# Patient Record
Sex: FEMALE | Race: WHITE | Employment: PART TIME | URBAN - METROPOLITAN AREA
[De-identification: names, ages, dates, MRNs, and addresses within clinical notes are randomized per-mention and may not be internally consistent; named-entity substitution may affect disease eponyms.]

---

## 2017-12-19 ENCOUNTER — CLINICAL DOCUMENTATION (OUTPATIENT)
Dept: PHARMACY | Facility: CLINIC | Age: 52
End: 2017-12-19

## 2017-12-19 NOTE — PROGRESS NOTES
Pharmacy Pop Care Documentation:   Patient application received. Patient missing MyChart account creation; pending as of today. Letter sent.

## 2017-12-19 NOTE — LETTER
? Visit with your physician (Second yearly visit)  ? Second A1c  ? Flu vaccination   ? Requirements if A1c is greater than 8 percent:  ? Engage with a [facility]) diabetes educator at one of our hospital locations or an ambulatory care coordinator by phone, if your A1c is greater than 8 percent. We will be reviewing your [employer] account and sending you reminders for needed actions. Please remember if you dont have a [address], you will need to submit documentation to [email or web address] or by fax to [phone]. As a reminder, if programs requirements are not met, your benefit may be terminated and you will not be eligible to participate in the program next year. Thank you for participating in the program and taking steps to improve your health.

## 2017-12-19 NOTE — PROGRESS NOTES
Pharmacy Pop Care Documentation:      The application for Bryson Pérez for enrollment into the diabetes management program has been reviewed and accepted on 12/19/17.     Hulen Cockayne

## 2018-02-08 ENCOUNTER — SCHEDULED TELEPHONE ENCOUNTER (OUTPATIENT)
Dept: PHARMACY | Facility: CLINIC | Age: 53
End: 2018-02-08

## 2018-02-08 NOTE — LETTER
[address]  [address], [address]  Phone: [phone]  Fax: [phone] [address]    02/12/18   Dear Loly Webb,     Thank you for taking the time to speak with us for the [employer] Program! Here is a summary of some things we talked about:    Current medications eligible for copay waiver, up to $600, through [facility]) (mail order) Pharmacy:  - Humulin N, Humulin R, generic ([facility] pharmacy-stocked) insulin syringes and pen needles, Agamatrix meter and supplies  Mail order pharmacy: [phone], option 0 - please allow 2 weeks for processing and shipping prescriptions. Requirements to be completed:  Initial requirements (due by 7/1/2018):  - Office visit with provider for diabetes (1st), A1c (1st), and on statin or contraindication  Ongoing requirements (due by 12/15/2018):  - Office visit with provider for DM (2nd), care coordinator/diabetes educator visit (if A1c over 8%), A1c (2nd), Lipid panel, urine protein, Pneumococcal vaccination: up to date, Influenza vaccination for 5062-7720 and Medication adherence over 70%    After your recent pharmacist visit, the below were identified as opportunities to assist you with your diabetes management:  - Pneumonia vaccine: according to our records, you may be due for a pneumonia vaccine. Talk to your doctor about the pneumonia vaccine ([id number]). - Statin: statin medications can not only lower your cholesterol, but they also lower your risk of heart attack and stroke. Talk to your doctor about whether this type of medication would be beneficial for you. - Included is a list of medications eligible for waived copay through the diabetes program for you toshare with your provider. Please work with your provider to ensure that the 2018 requirements are completed by the appropriate dates.  Please feel free to contact us with questions. Thank you,  [address] Team  Telephone: [phone] Option #7   Fax: [phone]  Email: [email or web address]

## 2018-02-08 NOTE — TELEPHONE ENCOUNTER
Never Used      ASSESSMENT:  Initial Program Requirements (to be completed by 7/1/2018):  [] OV with provider for DM (1st) - says sees every 3 months (next appt March)  [] A1c (1st)  [] On statin or contraindication(s) Not identified states may have taken before but unsure, possibly increased her blood sugars? [x] On ACEi/ARB or contraindication(s) Normal blood pressure, urinary albumin-to-creatinine ratio, and eGFR - per CareEverywhere encounter 12/14/2017 - had OV 11/27/2017 with Dr. Carmita Rueda, /60 mmHg    Ongoing Program Requirements (to be completed by 12/15/2018):  [] OV with provider for DM (2nd)  [] ACC/diabetes educator visit (if A1c over 8%)  [] A1c (2nd)  [] Lipid panel  [] Urine protein  [] Pneumococcal vaccination: up to date - due for PPSV23 per records  [] Influenza vaccination for 3150-8130 - states does not get them yearly  [] Medication adherence over 70%    Formulary Medication Review:  Non-formulary or medications with cost-effective alternatives: none reported/identified. Current medications eligible for copay waiver, up to $600, through [facility]) (mail order) Pharmacy:  - Humulin N, Humulin R  - Generic ([address] [place] [address] pharmacy-stocked) insulin syringes and pen needles  - Agamatrix meter and supplies      Other Considerations:  - Glycemic Goal: <7.0%. Is not at blood glucose goal but is on basal-bolus insulin therapy. .   - Blood Pressure Goal: BP less than 140/90 mmHg due to history of DM: Is at blood pressure goal   - Lipids: DM with LDL  mg/dL with calculated 10-yr ASCVD risk less than 7.5%, candidate for moderate intensity statin. Per 2018 ADA guidelines, candidate for moderate intensity statin. Last ALT WNL. Not currently on statin therapy, unclear history per report. - Smoking status: never smoked    PLAN:  - Consideration(s) for provider: faxed Chetna Matthews MD at [phone].  Fax confirmed 2/12/18 2:30pm.  · 90 day Rxs: Humulin R vial  · Rx insulin syringe - 1/2 mL 31G x 8mm  · Consider moderate intensity statin such as pravastatin 40 mg nightly - pt states she will discuss at next appt  · PPSV23 as appropriate - pt states she will f/u at next appt  - DM program gaps identified:   · Initial requirements: OV with provider for DM (1st), A1c (1st), and on statin or contraindication  · Ongoing requirements: OV with provider for DM (2nd), ACC/diabetes educator visit (if A1c over 8%), A1c (2nd), Lipid panel, urine protein, Pneumococcal vaccination: up to date, Influenza vaccination for 5026-8562 and Medication adherence over 70%  - Education to patient: indication and benefit statin, PPSV23; possible insulins, will send copy of DM program formulary for her and her doctor to review if needed  - Follow up: PCP for identified gaps or as scheduled below  - Upcoming appointments:   Future Appointments  Date Time Provider Dawn Campovered   2/8/2018 11:00 AM SCHEDULE, [facility] None     Estrada Manzo, PharmD, [facility] Pharmacist  Direct: [phone]  Dept: [phone] (toll free [phone], option 7)     CLINICAL PHARMACY CONSULT: MED RECONCILIATION/REVIEW ADDENDUM    For Pharmacy Admin Tracking Only    PHSO: Yes  Total # of Interventions Recommended: 4  - Updated Order #: 1 Updated Order Reason(s):  Other  Total Interventions Accepted: 1  Time Spent (min): 45

## 2018-06-04 ENCOUNTER — HOSPITAL ENCOUNTER (OUTPATIENT)
Age: 53
Discharge: HOME | End: 2018-06-06
Payer: COMMERCIAL

## 2018-06-04 PROCEDURE — 80053 COMPREHEN METABOLIC PANEL: CPT

## 2018-06-04 PROCEDURE — 82306 VITAMIN D 25 HYDROXY: CPT

## 2018-06-04 PROCEDURE — 83036 HEMOGLOBIN GLYCOSYLATED A1C: CPT

## 2018-06-04 PROCEDURE — 84443 ASSAY THYROID STIM HORMONE: CPT

## 2018-06-04 PROCEDURE — 82746 ASSAY OF FOLIC ACID SERUM: CPT

## 2018-06-04 PROCEDURE — 85027 COMPLETE CBC AUTOMATED: CPT

## 2018-06-04 PROCEDURE — 80061 LIPID PANEL: CPT

## 2018-06-04 PROCEDURE — 82607 VITAMIN B-12: CPT

## 2018-06-04 PROCEDURE — 82044 UR ALBUMIN SEMIQUANTITATIVE: CPT

## 2018-06-05 LAB
ALBUMIN SERPL-MCNC: 4.7 G/DL (ref 3.5–5.2)
ALP BLD-CCNC: 122 U/L (ref 35–104)
ALT SERPL-CCNC: 27 U/L (ref 0–32)
ANION GAP SERPL CALCULATED.3IONS-SCNC: 17 MMOL/L (ref 7–16)
AST SERPL-CCNC: 27 U/L (ref 0–31)
BILIRUB SERPL-MCNC: 0.3 MG/DL (ref 0–1.2)
BUN BLDV-MCNC: 12 MG/DL (ref 6–20)
CALCIUM SERPL-MCNC: 10.1 MG/DL (ref 8.6–10.2)
CHLORIDE BLD-SCNC: 98 MMOL/L (ref 98–107)
CHOLESTEROL, TOTAL: 241 MG/DL (ref 0–199)
CO2: 24 MMOL/L (ref 22–29)
CREAT SERPL-MCNC: 0.6 MG/DL (ref 0.5–1)
FOLATE: >20 NG/ML (ref 4.8–24.2)
GFR AFRICAN AMERICAN: >60
GFR NON-AFRICAN AMERICAN: >60 ML/MIN/1.73
GLUCOSE BLD-MCNC: 220 MG/DL (ref 74–109)
HBA1C MFR BLD: 8.9 % (ref 4.8–5.9)
HCT VFR BLD CALC: 38.7 % (ref 34–48)
HDLC SERPL-MCNC: 66 MG/DL
HEMOGLOBIN: 12.6 G/DL (ref 11.5–15.5)
LDL CHOLESTEROL CALCULATED: 156 MG/DL (ref 0–99)
MCH RBC QN AUTO: 31.1 PG (ref 26–35)
MCHC RBC AUTO-ENTMCNC: 32.6 % (ref 32–34.5)
MCV RBC AUTO: 95.6 FL (ref 80–99.9)
MICROALBUMIN UR-MCNC: 16.3 MG/L
PDW BLD-RTO: 12.9 FL (ref 11.5–15)
PLATELET # BLD: 336 E9/L (ref 130–450)
PMV BLD AUTO: 10.2 FL (ref 7–12)
POTASSIUM SERPL-SCNC: 4.7 MMOL/L (ref 3.5–5)
RBC # BLD: 4.05 E12/L (ref 3.5–5.5)
SODIUM BLD-SCNC: 139 MMOL/L (ref 132–146)
TOTAL PROTEIN: 7.6 G/DL (ref 6.4–8.3)
TRIGL SERPL-MCNC: 97 MG/DL (ref 0–149)
TSH SERPL DL<=0.05 MIU/L-ACNC: 1.02 UIU/ML (ref 0.27–4.2)
VITAMIN B-12: 804 PG/ML (ref 211–946)
VITAMIN D 25-HYDROXY: 33 NG/ML (ref 30–100)
VLDLC SERPL CALC-MCNC: 19 MG/DL
WBC # BLD: 6.4 E9/L (ref 4.5–11.5)

## 2018-07-23 ENCOUNTER — CLINICAL DOCUMENTATION (OUTPATIENT)
Dept: PHARMACY | Facility: CLINIC | Age: 53
End: 2018-07-23

## 2018-07-25 ENCOUNTER — OFFICE VISIT (OUTPATIENT)
Dept: CARDIOLOGY CLINIC | Age: 53
End: 2018-07-25
Payer: COMMERCIAL

## 2018-07-25 VITALS
SYSTOLIC BLOOD PRESSURE: 128 MMHG | WEIGHT: 133 LBS | HEIGHT: 63 IN | BODY MASS INDEX: 23.57 KG/M2 | HEART RATE: 72 BPM | DIASTOLIC BLOOD PRESSURE: 60 MMHG | RESPIRATION RATE: 16 BRPM

## 2018-07-25 DIAGNOSIS — R01.1 MURMUR: ICD-10-CM

## 2018-07-25 DIAGNOSIS — R06.02 SOB (SHORTNESS OF BREATH): Primary | ICD-10-CM

## 2018-07-25 DIAGNOSIS — R00.2 PALPITATIONS: ICD-10-CM

## 2018-07-25 PROCEDURE — 93000 ELECTROCARDIOGRAM COMPLETE: CPT | Performed by: INTERNAL MEDICINE

## 2018-07-25 PROCEDURE — 99244 OFF/OP CNSLTJ NEW/EST MOD 40: CPT | Performed by: INTERNAL MEDICINE

## 2018-07-25 NOTE — PROGRESS NOTES
Adelaida Barron  1965  Date of Service: 7/25/2018    Reason for Consultation: We were asked to see Adelaida Barron by Dr. Sowmya Julien MD  regarding shortness of breath and palpitations. History of Chief Complaint: This is a 52 y.o. female with a history of diabetes, hypothyroidism, a lifelong murmur, and palpitations. She states that she has had palpitations for 10-15 years. She does experience mild shortness of breath with the palpitations. She states that she has noticed a slight increase in the past couple of years and that they last 3-5 seconds when they occur. She feels that they occur approximately 2-3 times per week and are not associated with any activities. She does all normal activities including yard work, house work, stairs, and shopping. She rarely has episodes where she feels \"tired and needs to take an extra breath. \" However, no dyspnea on exertion or chest discomfort with any of the above activities. She does state that her entire life she has never been able to lie flat because she feels short of breath. She denies any lower extremity edema or presyncopal symptoms. REVIEW OF SYSTEMS:  As above. See patient questionair for further review of systems. CURRENT MEDICATIONS:  Current Outpatient Prescriptions   Medication Sig Dispense Refill    insulin regular (HUMULIN R;NOVOLIN R) 100 UNIT/ML injection Inject under the skin 10 units in the morning and 10 units in the evening      insulin NPH (HUMULIN N) 100 UNIT/ML injection vial Inject under the skin 5 units in the morning and 5 units in the evening      levothyroxine (SYNTHROID) 88 MCG tablet Take 88 mcg by mouth Daily. To bring the dos      vitamin D (ERGOCALCIFEROL) 53710 UNITS CAPS capsule Take 50,000 Units by mouth once a week. sunday       No current facility-administered medications for this visit.          ALLERGIES:  No Known Allergies    MEDICAL HISTORY:  Past Medical History:   Diagnosis Date    Acquired hypothyroidism 10/9/2016    Closed burst fracture of lumbar vertebra ([address] 10/9/2016    L3, fixation surgically    Closed fracture of left olecranon process 10/9/2016    Closed fracture of right patella 10/9/2016    ORIF    Closed wedge compression fracture of first lumbar vertebra with routine healing 10/9/2016    Kyphoplasty    Diabetes mellitus ([address]     controlled with meds    Heart murmur     no problems. Does not see cardiologist    Sciatica of left side 10/9/2016    Thyroid disease     Type 1 diabetes mellitus ([address] 10/9/2016       SURGICAL HISTORY:  Past Surgical History:   Procedure Laterality Date    BACK SURGERY  2016    CARPAL TUNNEL RELEASE  2012    bilateral     SECTION      x 2    COLONOSCOPY  13    EYE SURGERY  2017    cataract & lense placement    KNEE SURGERY         FAMILY HISTORY:  Family History   Problem Relation Age of Onset    High Blood Pressure Mother     Arthritis Mother     Arthritis Father     High Blood Pressure Father        SOCIAL HISTORY:  Social History     Social History    Marital status:      Spouse name: N/A    Number of children: N/A    Years of education: N/A     Social History Main Topics    Smoking status: Never Smoker    Smokeless tobacco: Never Used    Alcohol use No    Drug use: No    Sexual activity: Not Asked     Other Topics Concern    None     Social History Narrative    None       PHYSICAL EXAM:  Vitals:    18 1317   BP: 128/60   Pulse: 72   Resp: 16   Weight: 133 lb (60.3 kg)   Height: 5' 3\" (1.6 m)       GENERAL:  He is alert and oriented x 3, communicates well, in no distress. NECK:  No masses, trachea is mid position. Supple, full ROM, no JVD or bruits. No palpable thyromegaly or lymphadenopathy. HEART:  Regular rate and rhythm. Normal S1 and S2. There is an S4 gallop and a II/VI holosystolic murmur. No heaves. LUNGS:  Clear to auscultation bilaterally. No use of accessory muscles.    ABDOMEN:

## 2018-08-08 ENCOUNTER — HOSPITAL ENCOUNTER (OUTPATIENT)
Dept: CARDIOLOGY | Age: 53
Discharge: HOME | End: 2018-08-08
Payer: COMMERCIAL

## 2018-08-08 DIAGNOSIS — R06.02 SOB (SHORTNESS OF BREATH): ICD-10-CM

## 2018-08-08 DIAGNOSIS — R00.2 PALPITATIONS: ICD-10-CM

## 2018-08-08 DIAGNOSIS — R01.1 MURMUR: ICD-10-CM

## 2018-08-08 LAB
LV EF: 60 %
LVEF MODALITY: NORMAL

## 2018-08-08 PROCEDURE — 93306 TTE W/DOPPLER COMPLETE: CPT

## 2018-08-10 ENCOUNTER — TELEPHONE (OUTPATIENT)
Dept: CARDIOLOGY CLINIC | Age: 53
End: 2018-08-10

## 2018-08-11 ENCOUNTER — HOSPITAL ENCOUNTER (OUTPATIENT)
Age: 53
Discharge: HOME | End: 2018-08-13
Payer: COMMERCIAL

## 2018-08-11 LAB
T3 TOTAL: 181.1 NG/DL (ref 80–200)
T4 FREE: 2.04 NG/DL (ref 0.93–1.7)
TSH SERPL DL<=0.05 MIU/L-ACNC: 0.51 UIU/ML (ref 0.27–4.2)

## 2018-08-11 PROCEDURE — 84443 ASSAY THYROID STIM HORMONE: CPT

## 2018-08-11 PROCEDURE — 84436 ASSAY OF TOTAL THYROXINE: CPT

## 2018-08-11 PROCEDURE — 84480 ASSAY TRIIODOTHYRONINE (T3): CPT

## 2018-08-11 PROCEDURE — 84439 ASSAY OF FREE THYROXINE: CPT

## 2018-08-13 LAB — T4 TOTAL: 9.4 MCG/DL (ref 4.5–11.7)

## 2018-09-11 DIAGNOSIS — R00.2 PALPITATIONS: ICD-10-CM

## 2018-09-19 ENCOUNTER — TELEPHONE (OUTPATIENT)
Dept: CARDIOLOGY CLINIC | Age: 53
End: 2018-09-19

## 2018-09-19 PROBLEM — I47.1 PSVT (PAROXYSMAL SUPRAVENTRICULAR TACHYCARDIA) (HCC): Status: ACTIVE | Noted: 2018-09-19

## 2018-09-19 PROBLEM — I47.10 PSVT (PAROXYSMAL SUPRAVENTRICULAR TACHYCARDIA): Status: ACTIVE | Noted: 2018-09-19

## 2018-09-19 NOTE — TELEPHONE ENCOUNTER
Patient notified of Hallie Gonzalez results and Dr. Danielle Miller recommendation. Patient refused Toprol at this time. She stated if there was a pattern she should see starting medication, but she doesn't want to start for just one episode.

## 2018-09-26 ENCOUNTER — CLINICAL DOCUMENTATION (OUTPATIENT)
Dept: PHARMACY | Facility: CLINIC | Age: 53
End: 2018-09-26

## 2018-10-03 ENCOUNTER — CARE COORDINATION (OUTPATIENT)
Dept: CARE COORDINATION | Age: 53
End: 2018-10-03

## 2018-10-03 NOTE — CARE COORDINATION
RD outreach regarding employee beneficiary diabetes program. Introduced self/ explained reason for call. Patient did not want to talk to RD and stated that I not outreach to her again. RD will not continue outreach to patient (A1C >8%). Patient will not meet requirements.

## 2018-10-30 ENCOUNTER — OFFICE VISIT (OUTPATIENT)
Dept: ENDOCRINOLOGY | Age: 53
End: 2018-10-30
Payer: COMMERCIAL

## 2018-10-30 VITALS
OXYGEN SATURATION: 99 % | HEIGHT: 63 IN | DIASTOLIC BLOOD PRESSURE: 88 MMHG | TEMPERATURE: 98.1 F | BODY MASS INDEX: 23.78 KG/M2 | SYSTOLIC BLOOD PRESSURE: 138 MMHG | WEIGHT: 134.2 LBS | RESPIRATION RATE: 16 BRPM | HEART RATE: 81 BPM

## 2018-10-30 DIAGNOSIS — E10.9 TYPE 1 DIABETES MELLITUS WITHOUT COMPLICATION (HCC): Primary | ICD-10-CM

## 2018-10-30 DIAGNOSIS — E16.2 HYPOGLYCEMIA: ICD-10-CM

## 2018-10-30 DIAGNOSIS — E07.9 THYROID DISEASE: ICD-10-CM

## 2018-10-30 LAB — HBA1C MFR BLD: 9.3 %

## 2018-10-30 PROCEDURE — 83036 HEMOGLOBIN GLYCOSYLATED A1C: CPT | Performed by: INTERNAL MEDICINE

## 2018-10-30 PROCEDURE — 99204 OFFICE O/P NEW MOD 45 MIN: CPT | Performed by: INTERNAL MEDICINE

## 2018-10-30 RX ORDER — CLINDAMYCIN PHOSPHATE 10 UG/ML
LOTION TOPICAL PRN
COMMUNITY

## 2018-10-30 NOTE — PATIENT INSTRUCTIONS
Keep NPH insulin at 5 units twice daily  Keep the morning dose of Reg insulin at 10 units and decrease the evening dose to 5 units  Follow through with completing a Continuous Glucometer Monitoring study  Decrease the Sunday dose of levothyroxine to 1/2 tablet.  (Keep all other days at one full tablet)  Have a repeat TSH performed in six weeks  See me back in two months  Call with questions or concerns: [phone]

## 2018-10-30 NOTE — PROGRESS NOTES
Genitourinary    [] Frequent urination  [] Burning with urination  [] Blood in urine       Blood and Lymphatics    [] Easy bruising  [] Excessive bleeding  [] Swollen lymph nodes       Neurologic    [] Memory loss   [] Headaches    [] Numbness/Tingling              PE:   Gen - /88 (Site: Left Upper Arm, Position: Sitting, Cuff Size: Medium Adult)   Pulse 81   Temp 98.1 °F (36.7 °C) (Temporal)   Resp 16   Ht 5' 3\" (1.6 m)   Wt 134 lb 3.2 oz (60.9 kg)   LMP 06/17/2013   SpO2 99%   BMI 23.77 kg/m²        WN, WD, NAD   Eyes - sclera without injection, no edema of eyelids, EOMI   Neck - no thyroid nodules, trachea midline   Cardio -  RRR without MGR, 2+ LE edema present   Pulm - CTA b/l, nml respiratory effort   Abd - soft, NT/ND, no masses   Neuro - moves all extremities well, biceps reflexes 1+ b/l,         no deficits in soft touch over extremities   Psych -  nml mood, full affect   H/L - no cervical or subclavian lymphadenopathy    Lab and Imaging:     Ref.  Range 6/4/2018 12:00 8/10/2018 23:59   Sodium Latest Ref Range: 132 - 146 mmol/L 139    Potassium Latest Ref Range: 3.5 - 5.0 mmol/L 4.7    Chloride Latest Ref Range: 98 - 107 mmol/L 98    CO2 Latest Ref Range: 22 - 29 mmol/L 24    BUN Latest Ref Range: 6 - 20 mg/dL 12    Creatinine Latest Ref Range: 0.5 - 1.0 mg/dL 0.6    Anion Gap Latest Ref Range: 7 - 16 mmol/L 17 (H)    GFR Non- Latest Ref Range: >=60 mL/min/1.73 >60    GFR African American Unknown >60    Glucose Latest Ref Range: 74 - 109 mg/dL 220 (H)    Calcium Latest Ref Range: 8.6 - 10.2 mg/dL 10.1    Total Protein Latest Ref Range: 6.4 - 8.3 g/dL 7.6    Cholesterol, Total Latest Ref Range: 0 - 199 mg/dL 241 (H)    HDL Cholesterol Latest Ref Range: >40 mg/dL 66    LDL Calculated Latest Ref Range: 0 - 99 mg/dL 156 (H)    Triglycerides Latest Ref Range: 0 - 149 mg/dL 97    VLDL Cholesterol Calculated Latest Units: mg/dL 19    Albumin Latest Ref Range: 3.5 - 5.2 g/dL 4.7    Alk

## 2018-11-05 ENCOUNTER — NURSE ONLY (OUTPATIENT)
Dept: ENDOCRINOLOGY | Age: 53
End: 2018-11-05
Payer: COMMERCIAL

## 2018-11-05 DIAGNOSIS — E10.9 TYPE 1 DIABETES MELLITUS WITHOUT COMPLICATION (HCC): ICD-10-CM

## 2018-11-05 PROCEDURE — 95250 CONT GLUC MNTR PHYS/QHP EQP: CPT | Performed by: INTERNAL MEDICINE

## 2018-11-07 ENCOUNTER — TELEPHONE (OUTPATIENT)
Dept: PHARMACY | Facility: CLINIC | Age: 53
End: 2018-11-07

## 2018-11-07 NOTE — LETTER
Clay Doll MD  Fax: [phone]  11/8/18    Davonte Mora  1965    Your patient is currently enrolled in [address] [place] [address] Diabetes Program. The goal of this voluntary program is to help [facility] employees and covered dependents reach their health maintenance goals in regards to their diabetes diagnosis. To remain active in the program, we require the patient to meet the following requirements and documentation must be provided by the below deadlines. Please address the following incompletions with patient to assist patient's program involvement or return a message to the below fax number with reason/contraindication as to why patient cannot complete requirement. Ongoing Program Requirements (to be completed by 12/15/2018):  [] On statin (list statin and dose or contraindication): __________   [] Pneumococcal vaccination (if applicable): Completed on __________  [] Influenza vaccination for 1033-9087: Completed on __________      Please contact our team with any questions.      Thank you,  [address] Team  Telephone [phone] Option #7   Fax [phone]

## 2018-11-08 ENCOUNTER — NURSE ONLY (OUTPATIENT)
Dept: ENDOCRINOLOGY | Age: 53
End: 2018-11-08
Payer: COMMERCIAL

## 2018-11-08 ENCOUNTER — CLINICAL DOCUMENTATION (OUTPATIENT)
Dept: ENDOCRINOLOGY | Age: 53
End: 2018-11-08

## 2018-11-08 DIAGNOSIS — E10.65 TYPE 1 DIABETES MELLITUS WITH HYPERGLYCEMIA (HCC): ICD-10-CM

## 2018-11-08 PROCEDURE — 95251 CONT GLUC MNTR ANALYSIS I&R: CPT | Performed by: INTERNAL MEDICINE

## 2018-12-11 ENCOUNTER — TELEPHONE (OUTPATIENT)
Dept: PHARMACY | Facility: CLINIC | Age: 53
End: 2018-12-11

## 2018-12-12 ENCOUNTER — TELEPHONE (OUTPATIENT)
Dept: PHARMACY | Facility: CLINIC | Age: 53
End: 2018-12-12

## 2018-12-13 ENCOUNTER — CLINICAL DOCUMENTATION (OUTPATIENT)
Dept: PHARMACY | Facility: CLINIC | Age: 53
End: 2018-12-13

## 2018-12-13 NOTE — LETTER
[address] [address]           12/13/18     Dear Brenna Pak,    We regret to inform you that you have been disqualified from The [address] [place] [address] DM Program because of the following:    Per your request on 12/12/18. You will not receive the copay waiver up to $600 towards your diabetic medications and supplies in 2018. If you qualify once again, you will be eligible to reapply to The [address] [place] [address] DM Program the following calendar year. [address] [place] [address] Team    [phone] Option #7    Email: [email or web address]    Fax Number: [phone]

## 2018-12-13 NOTE — PROGRESS NOTES
Pharmacy Pop Care Documentation:     Ladi Bella is being removed from the diabetes management program for the following reason(s): Per patient request    Nora Marquez

## 2019-04-18 ENCOUNTER — HOSPITAL ENCOUNTER (OUTPATIENT)
Age: 54
Discharge: HOME | End: 2019-04-18
Payer: COMMERCIAL

## 2019-04-18 PROCEDURE — 36415 COLL VENOUS BLD VENIPUNCTURE: CPT

## 2019-04-18 PROCEDURE — 86235 NUCLEAR ANTIGEN ANTIBODY: CPT

## 2019-04-18 PROCEDURE — 86038 ANTINUCLEAR ANTIBODIES: CPT

## 2019-04-19 LAB
ANTI-NUCLEAR ANTIBODY (ANA): NEGATIVE
ENA TO SMITH (SM) ANTIBODY: NEGATIVE

## 2019-05-31 ENCOUNTER — HOSPITAL ENCOUNTER (OUTPATIENT)
Age: 54
Discharge: HOME | End: 2019-06-02
Payer: COMMERCIAL

## 2019-05-31 LAB — TSH SERPL DL<=0.05 MIU/L-ACNC: 0.58 UIU/ML (ref 0.27–4.2)

## 2019-05-31 PROCEDURE — 84443 ASSAY THYROID STIM HORMONE: CPT

## 2019-07-08 ENCOUNTER — HOSPITAL ENCOUNTER (OUTPATIENT)
Age: 54
Discharge: HOME | End: 2019-07-10
Payer: COMMERCIAL

## 2019-07-08 LAB
ALBUMIN SERPL-MCNC: 4.6 G/DL (ref 3.5–5.2)
ALP BLD-CCNC: 129 U/L (ref 35–104)
ALT SERPL-CCNC: 40 U/L (ref 0–32)
ANION GAP SERPL CALCULATED.3IONS-SCNC: 13 MMOL/L (ref 7–16)
AST SERPL-CCNC: 37 U/L (ref 0–31)
BASOPHILS ABSOLUTE: 0.07 E9/L (ref 0–0.2)
BASOPHILS RELATIVE PERCENT: 1 % (ref 0–2)
BILIRUB SERPL-MCNC: 0.3 MG/DL (ref 0–1.2)
BUN BLDV-MCNC: 14 MG/DL (ref 6–20)
CALCIUM SERPL-MCNC: 10 MG/DL (ref 8.6–10.2)
CHLORIDE BLD-SCNC: 99 MMOL/L (ref 98–107)
CHOLESTEROL, TOTAL: 241 MG/DL (ref 0–199)
CO2: 27 MMOL/L (ref 22–29)
CREAT SERPL-MCNC: 0.7 MG/DL (ref 0.5–1)
EOSINOPHILS ABSOLUTE: 0.15 E9/L (ref 0.05–0.5)
EOSINOPHILS RELATIVE PERCENT: 2.2 % (ref 0–6)
GFR AFRICAN AMERICAN: >60
GFR NON-AFRICAN AMERICAN: >60 ML/MIN/1.73
GLUCOSE BLD-MCNC: 115 MG/DL (ref 74–99)
HBA1C MFR BLD: 8.9 % (ref 4–5.6)
HCT VFR BLD CALC: 39.1 % (ref 34–48)
HDLC SERPL-MCNC: 73 MG/DL
HEMOGLOBIN: 12.7 G/DL (ref 11.5–15.5)
IMMATURE GRANULOCYTES #: 0.05 E9/L
IMMATURE GRANULOCYTES %: 0.7 % (ref 0–5)
LDL CHOLESTEROL CALCULATED: 141 MG/DL (ref 0–99)
LYMPHOCYTES ABSOLUTE: 1.75 E9/L (ref 1.5–4)
LYMPHOCYTES RELATIVE PERCENT: 25.7 % (ref 20–42)
MCH RBC QN AUTO: 31.6 PG (ref 26–35)
MCHC RBC AUTO-ENTMCNC: 32.5 % (ref 32–34.5)
MCV RBC AUTO: 97.3 FL (ref 80–99.9)
MICROALBUMIN UR-MCNC: 13.2 MG/L
MONOCYTES ABSOLUTE: 0.53 E9/L (ref 0.1–0.95)
MONOCYTES RELATIVE PERCENT: 7.8 % (ref 2–12)
NEUTROPHILS ABSOLUTE: 4.25 E9/L (ref 1.8–7.3)
NEUTROPHILS RELATIVE PERCENT: 62.6 % (ref 43–80)
PDW BLD-RTO: 12.8 FL (ref 11.5–15)
PLATELET # BLD: 330 E9/L (ref 130–450)
PMV BLD AUTO: 10.4 FL (ref 7–12)
POTASSIUM SERPL-SCNC: 4.6 MMOL/L (ref 3.5–5)
RBC # BLD: 4.02 E12/L (ref 3.5–5.5)
SODIUM BLD-SCNC: 139 MMOL/L (ref 132–146)
TOTAL PROTEIN: 7.7 G/DL (ref 6.4–8.3)
TRIGL SERPL-MCNC: 134 MG/DL (ref 0–149)
TSH SERPL DL<=0.05 MIU/L-ACNC: 0.65 UIU/ML (ref 0.27–4.2)
VLDLC SERPL CALC-MCNC: 27 MG/DL
WBC # BLD: 6.8 E9/L (ref 4.5–11.5)

## 2019-07-08 PROCEDURE — 85025 COMPLETE CBC W/AUTO DIFF WBC: CPT

## 2019-07-08 PROCEDURE — 82044 UR ALBUMIN SEMIQUANTITATIVE: CPT

## 2019-07-08 PROCEDURE — 80053 COMPREHEN METABOLIC PANEL: CPT

## 2019-07-08 PROCEDURE — 83036 HEMOGLOBIN GLYCOSYLATED A1C: CPT

## 2019-07-08 PROCEDURE — 80061 LIPID PANEL: CPT

## 2019-07-08 PROCEDURE — 84443 ASSAY THYROID STIM HORMONE: CPT

## 2019-08-15 ENCOUNTER — HOSPITAL ENCOUNTER (OUTPATIENT)
Age: 54
Discharge: HOME | End: 2019-08-17
Payer: COMMERCIAL

## 2019-08-15 PROCEDURE — 88305 TISSUE EXAM BY PATHOLOGIST: CPT

## 2019-08-29 ENCOUNTER — HOSPITAL ENCOUNTER (OUTPATIENT)
Age: 54
Discharge: HOME | End: 2019-08-29
Payer: COMMERCIAL

## 2019-08-29 LAB
BASOPHILS ABSOLUTE: 0.05 E9/L (ref 0–0.2)
BASOPHILS RELATIVE PERCENT: 0.7 % (ref 0–2)
EOSINOPHILS ABSOLUTE: 0.13 E9/L (ref 0.05–0.5)
EOSINOPHILS RELATIVE PERCENT: 1.8 % (ref 0–6)
FERRITIN: 68 NG/ML
FOLLICLE STIMULATING HORMONE: 84.8 MIU/ML
HCT VFR BLD CALC: 39 % (ref 34–48)
HEMOGLOBIN: 12.8 G/DL (ref 11.5–15.5)
IMMATURE GRANULOCYTES #: 0.02 E9/L
IMMATURE GRANULOCYTES %: 0.3 % (ref 0–5)
IRON: 92 MCG/DL (ref 37–145)
LUTEINIZING HORMONE: 55 MIU/ML
LYMPHOCYTES ABSOLUTE: 1.87 E9/L (ref 1.5–4)
LYMPHOCYTES RELATIVE PERCENT: 26.6 % (ref 20–42)
MCH RBC QN AUTO: 31.5 PG (ref 26–35)
MCHC RBC AUTO-ENTMCNC: 32.8 % (ref 32–34.5)
MCV RBC AUTO: 96.1 FL (ref 80–99.9)
MONOCYTES ABSOLUTE: 0.46 E9/L (ref 0.1–0.95)
MONOCYTES RELATIVE PERCENT: 6.5 % (ref 2–12)
NEUTROPHILS ABSOLUTE: 4.5 E9/L (ref 1.8–7.3)
NEUTROPHILS RELATIVE PERCENT: 64.1 % (ref 43–80)
PDW BLD-RTO: 12 FL (ref 11.5–15)
PLATELET # BLD: 320 E9/L (ref 130–450)
PMV BLD AUTO: 10 FL (ref 7–12)
RBC # BLD: 4.06 E12/L (ref 3.5–5.5)
T3 TOTAL: 94.56 NG/DL (ref 80–200)
T4 TOTAL: 9.4 MCG/DL (ref 4.5–11.7)
TSH SERPL DL<=0.05 MIU/L-ACNC: 0.85 UIU/ML (ref 0.27–4.2)
WBC # BLD: 7 E9/L (ref 4.5–11.5)

## 2019-08-29 PROCEDURE — 85025 COMPLETE CBC W/AUTO DIFF WBC: CPT

## 2019-08-29 PROCEDURE — 84270 ASSAY OF SEX HORMONE GLOBUL: CPT

## 2019-08-29 PROCEDURE — 86038 ANTINUCLEAR ANTIBODIES: CPT

## 2019-08-29 PROCEDURE — 82728 ASSAY OF FERRITIN: CPT

## 2019-08-29 PROCEDURE — 84630 ASSAY OF ZINC: CPT

## 2019-08-29 PROCEDURE — 84403 ASSAY OF TOTAL TESTOSTERONE: CPT

## 2019-08-29 PROCEDURE — 83540 ASSAY OF IRON: CPT

## 2019-08-29 PROCEDURE — 82627 DEHYDROEPIANDROSTERONE: CPT

## 2019-08-29 PROCEDURE — 36415 COLL VENOUS BLD VENIPUNCTURE: CPT

## 2019-08-29 PROCEDURE — 84480 ASSAY TRIIODOTHYRONINE (T3): CPT

## 2019-08-29 PROCEDURE — 83002 ASSAY OF GONADOTROPIN (LH): CPT

## 2019-08-29 PROCEDURE — 84443 ASSAY THYROID STIM HORMONE: CPT

## 2019-08-29 PROCEDURE — 83001 ASSAY OF GONADOTROPIN (FSH): CPT

## 2019-08-29 PROCEDURE — 84436 ASSAY OF TOTAL THYROXINE: CPT

## 2019-08-30 LAB — ANTI-NUCLEAR ANTIBODY (ANA): NEGATIVE

## 2019-09-01 LAB — DHEAS (DHEA SULFATE): 41 UG/DL (ref 26–200)

## 2019-09-04 LAB
SEX HORMONE BINDING GLOBULIN: 149 NMOL/L (ref 30–135)
TESTOSTERONE FREE-NONMALE: ABNORMAL PG/ML (ref 0.6–3.8)
TESTOSTERONE TOTAL: <3 NG/DL (ref 20–70)
ZINC: 94.7 UG/DL (ref 60–120)

## 2020-02-11 ENCOUNTER — HOSPITAL ENCOUNTER (OUTPATIENT)
Age: 55
Discharge: HOME | End: 2020-02-13
Payer: COMMERCIAL

## 2020-02-11 LAB
ALBUMIN SERPL-MCNC: 4.9 G/DL (ref 3.5–5.2)
ALP BLD-CCNC: 123 U/L (ref 35–104)
ALT SERPL-CCNC: 25 U/L (ref 0–32)
ANION GAP SERPL CALCULATED.3IONS-SCNC: 19 MMOL/L (ref 7–16)
AST SERPL-CCNC: 24 U/L (ref 0–31)
BASOPHILS ABSOLUTE: 0.05 E9/L (ref 0–0.2)
BASOPHILS RELATIVE PERCENT: 0.8 % (ref 0–2)
BILIRUB SERPL-MCNC: 0.5 MG/DL (ref 0–1.2)
BUN BLDV-MCNC: 16 MG/DL (ref 6–20)
CALCIUM SERPL-MCNC: 10.3 MG/DL (ref 8.6–10.2)
CHLORIDE BLD-SCNC: 100 MMOL/L (ref 98–107)
CHOLESTEROL, TOTAL: 221 MG/DL (ref 0–199)
CO2: 22 MMOL/L (ref 22–29)
CREAT SERPL-MCNC: 0.7 MG/DL (ref 0.5–1)
CREATININE URINE: 78 MG/DL (ref 29–226)
EOSINOPHILS ABSOLUTE: 0.14 E9/L (ref 0.05–0.5)
EOSINOPHILS RELATIVE PERCENT: 2.3 % (ref 0–6)
GFR AFRICAN AMERICAN: >60
GFR NON-AFRICAN AMERICAN: >60 ML/MIN/1.73
GLUCOSE BLD-MCNC: 215 MG/DL (ref 74–99)
HBA1C MFR BLD: 8.9 % (ref 4–5.6)
HCT VFR BLD CALC: 42.7 % (ref 34–48)
HDLC SERPL-MCNC: 77 MG/DL
HEMOGLOBIN: 13.5 G/DL (ref 11.5–15.5)
IMMATURE GRANULOCYTES #: 0.02 E9/L
IMMATURE GRANULOCYTES %: 0.3 % (ref 0–5)
LDL CHOLESTEROL CALCULATED: 127 MG/DL (ref 0–99)
LYMPHOCYTES ABSOLUTE: 2.13 E9/L (ref 1.5–4)
LYMPHOCYTES RELATIVE PERCENT: 34.4 % (ref 20–42)
MCH RBC QN AUTO: 30.3 PG (ref 26–35)
MCHC RBC AUTO-ENTMCNC: 31.6 % (ref 32–34.5)
MCV RBC AUTO: 95.7 FL (ref 80–99.9)
MICROALBUMIN UR-MCNC: 19.5 MG/L
MICROALBUMIN/CREAT UR-RTO: 25 (ref 0–30)
MONOCYTES ABSOLUTE: 0.46 E9/L (ref 0.1–0.95)
MONOCYTES RELATIVE PERCENT: 7.4 % (ref 2–12)
NEUTROPHILS ABSOLUTE: 3.4 E9/L (ref 1.8–7.3)
NEUTROPHILS RELATIVE PERCENT: 54.8 % (ref 43–80)
PDW BLD-RTO: 12.1 FL (ref 11.5–15)
PLATELET # BLD: 365 E9/L (ref 130–450)
PMV BLD AUTO: 10.9 FL (ref 7–12)
POTASSIUM SERPL-SCNC: 4.6 MMOL/L (ref 3.5–5)
RBC # BLD: 4.46 E12/L (ref 3.5–5.5)
SODIUM BLD-SCNC: 141 MMOL/L (ref 132–146)
T3 TOTAL: 85.04 NG/DL (ref 80–200)
T3 UPTAKE PERCENT: 34.2 % (ref 22.5–37)
T4 FREE: 1.63 NG/DL (ref 0.93–1.7)
TOTAL PROTEIN: 7.9 G/DL (ref 6.4–8.3)
TRIGL SERPL-MCNC: 87 MG/DL (ref 0–149)
TSH SERPL DL<=0.05 MIU/L-ACNC: 0.64 UIU/ML (ref 0.27–4.2)
VLDLC SERPL CALC-MCNC: 17 MG/DL
WBC # BLD: 6.2 E9/L (ref 4.5–11.5)

## 2020-02-11 PROCEDURE — 85025 COMPLETE CBC W/AUTO DIFF WBC: CPT

## 2020-02-11 PROCEDURE — 84439 ASSAY OF FREE THYROXINE: CPT

## 2020-02-11 PROCEDURE — 82570 ASSAY OF URINE CREATININE: CPT

## 2020-02-11 PROCEDURE — 84480 ASSAY TRIIODOTHYRONINE (T3): CPT

## 2020-02-11 PROCEDURE — 84479 ASSAY OF THYROID (T3 OR T4): CPT

## 2020-02-11 PROCEDURE — 80053 COMPREHEN METABOLIC PANEL: CPT

## 2020-02-11 PROCEDURE — 83036 HEMOGLOBIN GLYCOSYLATED A1C: CPT

## 2020-02-11 PROCEDURE — 82044 UR ALBUMIN SEMIQUANTITATIVE: CPT

## 2020-02-11 PROCEDURE — 84443 ASSAY THYROID STIM HORMONE: CPT

## 2020-02-11 PROCEDURE — 80061 LIPID PANEL: CPT

## 2020-02-12 LAB — T4 TOTAL: 9.4 MCG/DL (ref 4.5–11.7)

## 2020-07-14 ENCOUNTER — HOSPITAL ENCOUNTER (OUTPATIENT)
Age: 55
Discharge: HOME | End: 2020-07-16
Payer: COMMERCIAL

## 2020-07-14 LAB
ALBUMIN SERPL-MCNC: 4.8 G/DL (ref 3.5–5.2)
ALP BLD-CCNC: 133 U/L (ref 35–104)
ALT SERPL-CCNC: 34 U/L (ref 0–32)
ANION GAP SERPL CALCULATED.3IONS-SCNC: 16 MMOL/L (ref 7–16)
AST SERPL-CCNC: 31 U/L (ref 0–31)
BASOPHILS ABSOLUTE: 0.05 E9/L (ref 0–0.2)
BASOPHILS RELATIVE PERCENT: 0.8 % (ref 0–2)
BILIRUB SERPL-MCNC: 0.3 MG/DL (ref 0–1.2)
BUN BLDV-MCNC: 14 MG/DL (ref 6–20)
CALCIUM SERPL-MCNC: 10.5 MG/DL (ref 8.6–10.2)
CHLORIDE BLD-SCNC: 100 MMOL/L (ref 98–107)
CHOLESTEROL, TOTAL: 200 MG/DL (ref 0–199)
CO2: 25 MMOL/L (ref 22–29)
CREAT SERPL-MCNC: 0.8 MG/DL (ref 0.5–1)
CREATININE URINE: 97 MG/DL (ref 29–226)
EOSINOPHILS ABSOLUTE: 0.21 E9/L (ref 0.05–0.5)
EOSINOPHILS RELATIVE PERCENT: 3.2 % (ref 0–6)
GFR AFRICAN AMERICAN: >60
GFR NON-AFRICAN AMERICAN: >60 ML/MIN/1.73
GLUCOSE BLD-MCNC: 91 MG/DL (ref 74–99)
HBA1C MFR BLD: 9.9 % (ref 4–5.6)
HCT VFR BLD CALC: 39.2 % (ref 34–48)
HDLC SERPL-MCNC: 65 MG/DL
HEMOGLOBIN: 12.4 G/DL (ref 11.5–15.5)
IMMATURE GRANULOCYTES #: 0.01 E9/L
IMMATURE GRANULOCYTES %: 0.2 % (ref 0–5)
LDL CHOLESTEROL CALCULATED: 112 MG/DL (ref 0–99)
LYMPHOCYTES ABSOLUTE: 2.26 E9/L (ref 1.5–4)
LYMPHOCYTES RELATIVE PERCENT: 34.8 % (ref 20–42)
MCH RBC QN AUTO: 30.3 PG (ref 26–35)
MCHC RBC AUTO-ENTMCNC: 31.6 % (ref 32–34.5)
MCV RBC AUTO: 95.8 FL (ref 80–99.9)
MICROALBUMIN UR-MCNC: <12 MG/L
MICROALBUMIN/CREAT UR-RTO: ABNORMAL (ref 0–30)
MONOCYTES ABSOLUTE: 0.6 E9/L (ref 0.1–0.95)
MONOCYTES RELATIVE PERCENT: 9.2 % (ref 2–12)
NEUTROPHILS ABSOLUTE: 3.36 E9/L (ref 1.8–7.3)
NEUTROPHILS RELATIVE PERCENT: 51.8 % (ref 43–80)
PDW BLD-RTO: 12.5 FL (ref 11.5–15)
PLATELET # BLD: 356 E9/L (ref 130–450)
PMV BLD AUTO: 10.8 FL (ref 7–12)
POTASSIUM SERPL-SCNC: 4.7 MMOL/L (ref 3.5–5)
RBC # BLD: 4.09 E12/L (ref 3.5–5.5)
SODIUM BLD-SCNC: 141 MMOL/L (ref 132–146)
TOTAL PROTEIN: 7.4 G/DL (ref 6.4–8.3)
TRIGL SERPL-MCNC: 115 MG/DL (ref 0–149)
TSH SERPL DL<=0.05 MIU/L-ACNC: 0.36 UIU/ML (ref 0.27–4.2)
VLDLC SERPL CALC-MCNC: 23 MG/DL
WBC # BLD: 6.5 E9/L (ref 4.5–11.5)

## 2020-07-14 PROCEDURE — 85025 COMPLETE CBC W/AUTO DIFF WBC: CPT

## 2020-07-14 PROCEDURE — 80053 COMPREHEN METABOLIC PANEL: CPT

## 2020-07-14 PROCEDURE — 82044 UR ALBUMIN SEMIQUANTITATIVE: CPT

## 2020-07-14 PROCEDURE — 80061 LIPID PANEL: CPT

## 2020-07-14 PROCEDURE — 84443 ASSAY THYROID STIM HORMONE: CPT

## 2020-07-14 PROCEDURE — 82570 ASSAY OF URINE CREATININE: CPT

## 2020-07-14 PROCEDURE — 83036 HEMOGLOBIN GLYCOSYLATED A1C: CPT

## 2020-12-10 ENCOUNTER — HOSPITAL ENCOUNTER (OUTPATIENT)
Age: 55
Discharge: HOME | End: 2020-12-10
Payer: COMMERCIAL

## 2020-12-10 LAB — TSH SERPL DL<=0.05 MIU/L-ACNC: 0.58 UIU/ML (ref 0.27–4.2)

## 2020-12-10 PROCEDURE — 36415 COLL VENOUS BLD VENIPUNCTURE: CPT

## 2020-12-10 PROCEDURE — 84443 ASSAY THYROID STIM HORMONE: CPT

## 2021-01-20 ENCOUNTER — HOSPITAL ENCOUNTER (EMERGENCY)
Age: 56
Discharge: HOME | End: 2021-01-20
Payer: COMMERCIAL

## 2021-01-20 ENCOUNTER — APPOINTMENT (OUTPATIENT)
Dept: GENERAL RADIOLOGY | Age: 56
End: 2021-01-20
Payer: COMMERCIAL

## 2021-01-20 ENCOUNTER — NURSE TRIAGE (OUTPATIENT)
Dept: OTHER | Facility: CLINIC | Age: 56
End: 2021-01-20

## 2021-01-20 VITALS
HEART RATE: 84 BPM | WEIGHT: 130 LBS | SYSTOLIC BLOOD PRESSURE: 148 MMHG | TEMPERATURE: 97.2 F | BODY MASS INDEX: 23.04 KG/M2 | OXYGEN SATURATION: 100 % | RESPIRATION RATE: 14 BRPM | DIASTOLIC BLOOD PRESSURE: 88 MMHG | HEIGHT: 63 IN

## 2021-01-20 DIAGNOSIS — S83.91XA SPRAIN OF RIGHT KNEE, UNSPECIFIED LIGAMENT, INITIAL ENCOUNTER: Primary | ICD-10-CM

## 2021-01-20 PROCEDURE — 73560 X-RAY EXAM OF KNEE 1 OR 2: CPT

## 2021-01-20 PROCEDURE — 99284 EMERGENCY DEPT VISIT MOD MDM: CPT

## 2021-01-20 PROCEDURE — 6370000000 HC RX 637 (ALT 250 FOR IP): Performed by: PHYSICIAN ASSISTANT

## 2021-01-20 RX ORDER — HYDROCODONE BITARTRATE AND ACETAMINOPHEN 5; 325 MG/1; MG/1
1 TABLET ORAL ONCE
Status: COMPLETED | OUTPATIENT
Start: 2021-01-20 | End: 2021-01-20

## 2021-01-20 RX ORDER — NAPROXEN 500 MG/1
500 TABLET ORAL 2 TIMES DAILY
Qty: 14 TABLET | Refills: 0 | Status: SHIPPED | OUTPATIENT
Start: 2021-01-20 | End: 2021-11-16 | Stop reason: CLARIF

## 2021-01-20 RX ORDER — HYDROCODONE BITARTRATE AND ACETAMINOPHEN 5; 325 MG/1; MG/1
1 TABLET ORAL EVERY 6 HOURS PRN
Qty: 12 TABLET | Refills: 0 | Status: SHIPPED | OUTPATIENT
Start: 2021-01-20 | End: 2021-01-23

## 2021-01-20 RX ADMIN — HYDROCODONE BITARTRATE AND ACETAMINOPHEN 1 TABLET: 5; 325 TABLET ORAL at 22:21

## 2021-01-20 ASSESSMENT — PAIN DESCRIPTION - PAIN TYPE
TYPE: ACUTE PAIN
TYPE: ACUTE PAIN

## 2021-01-20 ASSESSMENT — PAIN DESCRIPTION - PROGRESSION: CLINICAL_PROGRESSION: GRADUALLY IMPROVING

## 2021-01-20 ASSESSMENT — PAIN SCALES - GENERAL: PAINLEVEL_OUTOF10: 10

## 2021-01-20 ASSESSMENT — PAIN DESCRIPTION - ORIENTATION: ORIENTATION: RIGHT

## 2021-01-20 ASSESSMENT — PAIN DESCRIPTION - DESCRIPTORS: DESCRIPTORS: ACHING

## 2021-01-21 NOTE — TELEPHONE ENCOUNTER
Reason for Disposition   Can't stand (bear weight) or walk    Answer Assessment - Initial Assessment Questions  1. MECHANISM: \"How did the injury happen? \" (e.g., twisting injury, direct blow)       Slipped on ice, twisted right knee and landed on knee  2. ONSET: \"When did the injury happen? \" (Minutes or hours ago)       2030 today  3. LOCATION: \"Where is the injury located? \"       Right knee  4. APPEARANCE of INJURY: \"What does the injury look like? \"       Feels swollen  5. SEVERITY: \"Can you put weight on that leg? \" \"Can you walk? \"       Very little  6. SIZE: For cuts, bruises, or swelling, ask: \"How large is it? \" (e.g., inches or centimeters;  entire joint)       none  7. PAIN: \"Is there pain? \" If so, ask: \"How bad is the pain? \"    (e.g., Scale 1-10; or mild, moderate, severe)      12/10  8. TETANUS: For any breaks in the skin, ask: \"When was the last tetanus booster? \"      na  9. OTHER SYMPTOMS: \"Do you have any other symptoms? \"  (e.g., \"pop\" when knee injured, swelling, locking, buckling)       [place] a pop in knee  10. PREGNANCY: \"Is there any chance you are pregnant? \" \"When was your last menstrual period? \"        n/a    Protocols used: KNEE INJURY-ADULT-    Brief description of triage: Richar Brantley fell outside on ice @approximately 2030 1/20/2021. Caller twisted knee, heard a pop and landed on knee. Caller is having a hard time walking and can not put weight on knee. No cuts or bleeding at this time. Triage indicates for patient to Go to ED Now. Care advice provided, patient verbalizes understanding; denies any other questions or concerns; instructed to call back for any new or worsening symptoms. Attention Provider: Thank you for allowing me to participate in the care of your patient. The patient was connected to triage in response to symptoms provided. Please do not respond through this encounter as the response is not directed to a shared pool.

## 2021-01-21 NOTE — ED PROVIDER NOTES
Independent MLP  HPI:  21, Time: 10:06 PM DAISHA Oliver is a 60 y.o. female presenting to the ED for  Right knee pain , beginning today . Sailaja Borges The complaint has been persistent, moderate in severity, and worsened by movement of right knee . Patient comes in with complaint of right knee pain. She states she had a history of fracture of the knee in the past today she was coming outside and slipped on the ice. She was able to catch herself and did not fall. She did feel a pop in the knee. She denies any head injury no loss of consciousness no neck pain    Review of Systems:   A complete review of systems was performed and pertinent positives and negatives are stated within HPI, all other systems reviewed and are negative.          --------------------------------------------- PAST HISTORY ---------------------------------------------  Past Medical History:  has a past medical history of Acquired hypothyroidism, Closed burst fracture of lumbar vertebra ([address] Closed fracture of left olecranon process, Closed fracture of right patella, Closed wedge compression fracture of first lumbar vertebra with routine healing, Heart murmur, Sciatica of left side, Thyroid disease, and Type 1 diabetes mellitus ([address] Past Surgical History:  has a past surgical history that includes  section; Carpal tunnel release (); eye surgery (2017); Colonoscopy (13); back surgery (2016); and knee surgery (2016). Social History:  reports that she has never smoked. She has never used smokeless tobacco. She reports that she does not drink alcohol or use drugs. Family History: family history includes Arthritis in her father and mother; High Blood Pressure in her father and mother; No Known Problems in her brother, brother, brother, brother, sister, sister, son, and son. The patients home medications have been reviewed. Allergies: Patient has no known allergies. -------------------------------------------------- RESULTS -------------------------------------------------  All laboratory and radiology results have been personally reviewed by myself   LABS:  No results found for this visit on 01/20/21. RADIOLOGY:  Interpreted by Radiologist.  XR KNEE RIGHT (1-2 VIEWS)   Final Result   No acute fracture or dislocation of the right knee. Old healed fracture of the patella. Moderate-sized suprapatellar effusion.          ------------------------- NURSING NOTES AND VITALS REVIEWED ---------------------------   The nursing notes within the ED encounter and vital signs as below have been reviewed. BP (!) 148/88   Pulse 84   Temp 97.2 °F (36.2 °C) (Temporal)   Resp 14   Ht 5' 3\" (1.6 m)   Wt 130 lb (59 kg)   LMP 06/17/2013   SpO2 100%   BMI 23.03 kg/m²   Oxygen Saturation Interpretation: Normal      ---------------------------------------------------PHYSICAL EXAM--------------------------------------      Constitutional/General: Alert and oriented x3, well appearing, non toxic in NAD  Head: Normocephalic and atraumatic  Eyes: PERRL, EOMI  Mouth: Oropharynx clear, handling secretions, no trismus  Neck: Supple, full ROM,   Pulmonary: Lungs clear to auscultation bilaterally, no wheezes, rales, or rhonchi. Not in respiratory distress  Cardiovascular:  Regular rate and rhythm, no murmurs, gallops, or rubs. 2+ distal pulses  Abdomen: Soft, non tender, non distended,   Extremities: Moves all extremities x 4. Warm and well perfused tender to palpation medial lateral right knee joint integrity intact no bony point tenderness of the lower tib-fib or femur.   Skin: warm and dry without rash  Neurologic: GCS 15,  Psych: Normal Affect      ------------------------------ ED COURSE/MEDICAL DECISION MAKING----------------------  Medications   HYDROcodone-acetaminophen (NORCO) 5-325 MG per tablet 1 tablet (1 tablet Oral Given 1/20/21 2221)         ED COURSE:

## 2021-02-26 ENCOUNTER — IMMUNIZATION (OUTPATIENT)
Dept: PRIMARY CARE CLINIC | Age: 56
End: 2021-02-26
Payer: COMMERCIAL

## 2021-02-26 PROCEDURE — 0001A COVID-19, PFIZER VACCINE 30MCG/0.3ML DOSE: CPT | Performed by: PHYSICIAN ASSISTANT

## 2021-02-26 PROCEDURE — 91300 COVID-19, PFIZER VACCINE 30MCG/0.3ML DOSE: CPT | Performed by: PHYSICIAN ASSISTANT

## 2021-03-23 ENCOUNTER — IMMUNIZATION (OUTPATIENT)
Dept: PRIMARY CARE CLINIC | Age: 56
End: 2021-03-23
Payer: COMMERCIAL

## 2021-03-23 PROCEDURE — 0002A COVID-19, PFIZER VACCINE 30MCG/0.3ML DOSE: CPT | Performed by: NURSE PRACTITIONER

## 2021-03-23 PROCEDURE — 91300 COVID-19, PFIZER VACCINE 30MCG/0.3ML DOSE: CPT | Performed by: NURSE PRACTITIONER

## 2021-04-13 ENCOUNTER — HOSPITAL ENCOUNTER (OUTPATIENT)
Age: 56
Discharge: HOME | End: 2021-04-13
Payer: COMMERCIAL

## 2021-04-13 LAB — TSH SERPL DL<=0.05 MIU/L-ACNC: 0.41 UIU/ML (ref 0.27–4.2)

## 2021-04-13 PROCEDURE — 36415 COLL VENOUS BLD VENIPUNCTURE: CPT

## 2021-04-13 PROCEDURE — 84443 ASSAY THYROID STIM HORMONE: CPT

## 2021-05-10 ENCOUNTER — HOSPITAL ENCOUNTER (OUTPATIENT)
Age: 56
Discharge: HOME | End: 2021-05-10
Payer: COMMERCIAL

## 2021-05-10 PROCEDURE — 82671 ASSAY OF ESTROGENS: CPT

## 2021-05-10 PROCEDURE — 84144 ASSAY OF PROGESTERONE: CPT

## 2021-05-10 PROCEDURE — 83001 ASSAY OF GONADOTROPIN (FSH): CPT

## 2021-05-10 PROCEDURE — 36415 COLL VENOUS BLD VENIPUNCTURE: CPT

## 2021-05-10 PROCEDURE — 84403 ASSAY OF TOTAL TESTOSTERONE: CPT

## 2021-05-10 PROCEDURE — 83002 ASSAY OF GONADOTROPIN (LH): CPT

## 2021-05-11 LAB
FOLLICLE STIMULATING HORMONE: 65.8 MIU/ML
LUTEINIZING HORMONE: 49.7 MIU/ML
TESTOSTERONE TOTAL: <2.5 NG/DL

## 2021-05-12 LAB — PROGESTERONE LEVEL: <0.05 NG/ML

## 2021-05-14 LAB
ESTRADIOL LEVEL: 3 PG/ML
ESTROGEN TOTAL: 22 PG/ML
ESTRONE: 19 PG/ML

## 2021-10-06 ENCOUNTER — APPOINTMENT (OUTPATIENT)
Dept: GENERAL RADIOLOGY | Age: 56
End: 2021-10-06
Payer: COMMERCIAL

## 2021-10-06 ENCOUNTER — HOSPITAL ENCOUNTER (EMERGENCY)
Age: 56
Discharge: HOME | End: 2021-10-06
Payer: COMMERCIAL

## 2021-10-06 VITALS
BODY MASS INDEX: 23.74 KG/M2 | HEIGHT: 63 IN | RESPIRATION RATE: 16 BRPM | HEART RATE: 70 BPM | TEMPERATURE: 97.5 F | SYSTOLIC BLOOD PRESSURE: 166 MMHG | OXYGEN SATURATION: 99 % | WEIGHT: 134 LBS | DIASTOLIC BLOOD PRESSURE: 86 MMHG

## 2021-10-06 DIAGNOSIS — S80.01XA CONTUSION OF RIGHT KNEE, INITIAL ENCOUNTER: Primary | ICD-10-CM

## 2021-10-06 DIAGNOSIS — I10 HYPERTENSION, UNSPECIFIED TYPE: ICD-10-CM

## 2021-10-06 PROCEDURE — 73562 X-RAY EXAM OF KNEE 3: CPT

## 2021-10-06 PROCEDURE — 90471 IMMUNIZATION ADMIN: CPT | Performed by: PHYSICIAN ASSISTANT

## 2021-10-06 PROCEDURE — 90715 TDAP VACCINE 7 YRS/> IM: CPT | Performed by: PHYSICIAN ASSISTANT

## 2021-10-06 PROCEDURE — 6360000002 HC RX W HCPCS: Performed by: PHYSICIAN ASSISTANT

## 2021-10-06 PROCEDURE — 6370000000 HC RX 637 (ALT 250 FOR IP): Performed by: PHYSICIAN ASSISTANT

## 2021-10-06 PROCEDURE — 99283 EMERGENCY DEPT VISIT LOW MDM: CPT

## 2021-10-06 RX ORDER — HYDROCODONE BITARTRATE AND ACETAMINOPHEN 5; 325 MG/1; MG/1
1 TABLET ORAL ONCE
Status: COMPLETED | OUTPATIENT
Start: 2021-10-06 | End: 2021-10-06

## 2021-10-06 RX ADMIN — TETANUS TOXOID, REDUCED DIPHTHERIA TOXOID AND ACELLULAR PERTUSSIS VACCINE, ADSORBED 0.5 ML: 5; 2.5; 8; 8; 2.5 SUSPENSION INTRAMUSCULAR at 15:27

## 2021-10-06 RX ADMIN — HYDROCODONE BITARTRATE AND ACETAMINOPHEN 1 TABLET: 5; 325 TABLET ORAL at 15:27

## 2021-10-06 ASSESSMENT — PAIN DESCRIPTION - DESCRIPTORS: DESCRIPTORS: DISCOMFORT

## 2021-10-06 ASSESSMENT — PAIN SCALES - GENERAL
PAINLEVEL_OUTOF10: 7
PAINLEVEL_OUTOF10: 7

## 2021-10-06 ASSESSMENT — PAIN DESCRIPTION - PROGRESSION: CLINICAL_PROGRESSION: NOT CHANGED

## 2021-10-06 ASSESSMENT — PAIN DESCRIPTION - ONSET: ONSET: ON-GOING

## 2021-10-06 ASSESSMENT — PAIN DESCRIPTION - PAIN TYPE: TYPE: ACUTE PAIN

## 2021-10-06 ASSESSMENT — PAIN DESCRIPTION - LOCATION: LOCATION: KNEE

## 2021-10-06 ASSESSMENT — PAIN DESCRIPTION - FREQUENCY: FREQUENCY: CONTINUOUS

## 2021-10-06 ASSESSMENT — PAIN DESCRIPTION - ORIENTATION: ORIENTATION: RIGHT

## 2021-10-06 NOTE — ED NOTES
Bed: 31  Expected date:   Expected time:   Means of arrival:   Comments:  EMS     Angelo Bonilla RN  10/06/21 6908
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
## 2021-11-11 ENCOUNTER — TELEPHONE (OUTPATIENT)
Dept: ADMINISTRATIVE | Age: 56
End: 2021-11-11

## 2021-11-11 NOTE — TELEPHONE ENCOUNTER
NP scheduled from the workque. Referral states NP saw [facility] in 2018 - however, pt c/o of Chest tightness/jaw pain - [facility] scheduling into Jan?     Patient Appointment Form:      PCP: Dr. [place]  Referring: Dr. [place]    Has the Patient:    Seen a Cardiologist? Yes [facility] 2018- recs in [address] [place] [address] Rd - c/o Chest tightness/jaw pain did not wait until Jan with [facility]    Had a heart catheterization? No    Had heart surgery? No    Had a stress test or nuclear stress test? No    Had an echocardiogram? Yes 8/8/18 [facility]     Had a vascular ultrasound? No    Had a 24/48 heart monitor or extended cardiac event monitor? Yes 9/11/18 Event Monitor Epic     Had recent blood work in the last 6 months? Had a pacemaker/ICD/ILR implant? No    Seen an Electrophysiologist? No        Will send records via:  All recs in [facility] [address] in [address] [place] [address] Rd - PCP recs should be in [place]       Date & time of appointment:  11/16/21 @ 10:45 with Dr. Andreas parks, soonest available

## 2021-11-16 ENCOUNTER — OFFICE VISIT (OUTPATIENT)
Dept: CARDIOLOGY CLINIC | Age: 56
End: 2021-11-16
Payer: COMMERCIAL

## 2021-11-16 VITALS
WEIGHT: 150.2 LBS | DIASTOLIC BLOOD PRESSURE: 70 MMHG | HEIGHT: 63 IN | SYSTOLIC BLOOD PRESSURE: 159 MMHG | HEART RATE: 74 BPM | BODY MASS INDEX: 26.61 KG/M2 | RESPIRATION RATE: 18 BRPM

## 2021-11-16 DIAGNOSIS — R07.2 PRECORDIAL PAIN: ICD-10-CM

## 2021-11-16 PROBLEM — R07.9 CHEST PAIN: Status: ACTIVE | Noted: 2021-11-16

## 2021-11-16 PROCEDURE — 99244 OFF/OP CNSLTJ NEW/EST MOD 40: CPT | Performed by: INTERNAL MEDICINE

## 2021-11-16 PROCEDURE — 93000 ELECTROCARDIOGRAM COMPLETE: CPT | Performed by: INTERNAL MEDICINE

## 2021-11-16 RX ORDER — LEVOTHYROXINE AND LIOTHYRONINE 57; 13.5 UG/1; UG/1
TABLET ORAL
COMMUNITY
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
## 2021-11-22 ENCOUNTER — TELEPHONE (OUTPATIENT)
Dept: CARDIOLOGY | Age: 56
End: 2021-11-22

## 2021-11-23 ENCOUNTER — TELEPHONE (OUTPATIENT)
Dept: CARDIOLOGY | Age: 56
End: 2021-11-23

## 2021-11-23 NOTE — TELEPHONE ENCOUNTER
Left message on voice mail reminding patient of nuclear stress test appointment on Nov. 29, 2021 at 0700. Instructions for test left on voice mail. Asked patient to call with any questions or if unable to keep appointment.

## 2021-11-29 ENCOUNTER — HOSPITAL ENCOUNTER (OUTPATIENT)
Dept: CARDIOLOGY | Age: 56
Discharge: HOME | End: 2021-11-29
Payer: COMMERCIAL

## 2021-11-29 ENCOUNTER — TELEPHONE (OUTPATIENT)
Dept: CARDIOLOGY CLINIC | Age: 56
End: 2021-11-29

## 2021-11-29 VITALS
BODY MASS INDEX: 26.58 KG/M2 | SYSTOLIC BLOOD PRESSURE: 158 MMHG | RESPIRATION RATE: 16 BRPM | DIASTOLIC BLOOD PRESSURE: 72 MMHG | HEIGHT: 63 IN | HEART RATE: 80 BPM | WEIGHT: 150 LBS

## 2021-11-29 DIAGNOSIS — R07.2 PRECORDIAL PAIN: ICD-10-CM

## 2021-11-29 LAB
LV EF: 82 %
LVEF MODALITY: NORMAL

## 2021-11-29 PROCEDURE — 2580000003 HC RX 258: Performed by: INTERNAL MEDICINE

## 2021-11-29 PROCEDURE — 78452 HT MUSCLE IMAGE SPECT MULT: CPT

## 2021-11-29 PROCEDURE — A9500 TC99M SESTAMIBI: HCPCS | Performed by: INTERNAL MEDICINE

## 2021-11-29 PROCEDURE — 3430000000 HC RX DIAGNOSTIC RADIOPHARMACEUTICAL: Performed by: INTERNAL MEDICINE

## 2021-11-29 PROCEDURE — 93017 CV STRESS TEST TRACING ONLY: CPT

## 2021-11-29 RX ORDER — SODIUM CHLORIDE 0.9 % (FLUSH) 0.9 %
10 SYRINGE (ML) INJECTION PRN
Status: DISCONTINUED | OUTPATIENT
Start: 2021-11-29 | End: 2021-11-30 | Stop reason: HOSPADM

## 2021-11-29 RX ADMIN — SODIUM CHLORIDE, PRESERVATIVE FREE 10 ML: 5 INJECTION INTRAVENOUS at 07:20

## 2021-11-29 RX ADMIN — Medication 30 MILLICURIE: at 09:20

## 2021-11-29 RX ADMIN — SODIUM CHLORIDE, PRESERVATIVE FREE 10 ML: 5 INJECTION INTRAVENOUS at 09:20

## 2021-11-29 RX ADMIN — Medication 10.5 MILLICURIE: at 07:20

## 2021-11-29 NOTE — TELEPHONE ENCOUNTER
Patient notified and instructed on stress test results and recommendation with medication and follow-up.   appt scheduled

## 2021-11-29 NOTE — PROCEDURES
30-35 minutes following the intravenous injection of 10.5 mCi of (Tc-Sestamibi) followed by 10 ml of Normal Saline. At peak exercise, the patient was injected intravenously with 30.0 mCi of (Tc-Sestamibi) followed by 10 ml of Normal Saline. Gated post-stress tomographic imaging was performed 20-25 minutes after stress. FINDINGS: The overall quality of the study was good. Left ventricular cavity size was noted to be normal.    Rotational analog analysis demonstrated no patient motion or abnormal extracardiac radioactivity. A moderate defect was present in the apical anterior wall(s) that was  small sized by quantification. The resting images reveal complete reversibility. Gated SPECT left ventricular ejection fraction was calculated to be 82%, with normal myocardial thickening and wall motion. Impression:    1. Exercise EKG was non-diagnostic secondary to baseline ST-T changes. 2. The patient experienced angina pectoris with exercise. 3. The myocardial perfusion imaging was abnormal. The abnormality was a a small sized completely reversible defect in the apical anterior wall. 4. Overall left ventricular systolic function was normal without regional wall motion abnormalities. 5. Exercise capacity was above average. 6. Intermediate risk general exercise treadmill test.    Thank you for sending your patient to this [employer].      Electronically signed by Ansley Bush MD on 11/29/21 at 12:32 PM EST

## 2021-11-30 RX ORDER — METOPROLOL SUCCINATE 50 MG/1
50 TABLET, EXTENDED RELEASE ORAL DAILY
Qty: 30 TABLET | Refills: 5 | Status: SHIPPED
Start: 2021-11-30 | End: 2022-03-08 | Stop reason: ALTCHOICE

## 2021-11-30 RX ORDER — ATORVASTATIN CALCIUM 40 MG/1
40 TABLET, FILM COATED ORAL DAILY
Qty: 30 TABLET | Refills: 5 | Status: SHIPPED | OUTPATIENT
Start: 2021-11-30

## 2021-12-01 ENCOUNTER — TELEPHONE (OUTPATIENT)
Dept: CARDIOLOGY CLINIC | Age: 56
End: 2021-12-01

## 2021-12-01 NOTE — TELEPHONE ENCOUNTER
Patient called and stated that she had a stress test and she was instructed to take medication and see you in follow-up.   Before she starts this medication she would like a full explination of how this stress test  was abnormal.

## 2021-12-02 NOTE — TELEPHONE ENCOUNTER
Left message per patient instruction (as she is working) as to Dr. Fran Wilson recommendations of an appt and left appt date and time as well.

## 2021-12-16 ENCOUNTER — OFFICE VISIT (OUTPATIENT)
Dept: CARDIOLOGY CLINIC | Age: 56
End: 2021-12-16
Payer: COMMERCIAL

## 2021-12-16 VITALS
RESPIRATION RATE: 16 BRPM | DIASTOLIC BLOOD PRESSURE: 88 MMHG | SYSTOLIC BLOOD PRESSURE: 173 MMHG | WEIGHT: 150.6 LBS | HEART RATE: 78 BPM | BODY MASS INDEX: 26.68 KG/M2 | HEIGHT: 63 IN

## 2021-12-16 DIAGNOSIS — R07.2 PRECORDIAL PAIN: ICD-10-CM

## 2021-12-16 DIAGNOSIS — I47.1 PSVT (PAROXYSMAL SUPRAVENTRICULAR TACHYCARDIA) (HCC): Primary | ICD-10-CM

## 2021-12-16 PROBLEM — I25.10 CAD (CORONARY ARTERY DISEASE): Status: ACTIVE | Noted: 2021-11-16

## 2021-12-16 PROCEDURE — 99214 OFFICE O/P EST MOD 30 MIN: CPT | Performed by: INTERNAL MEDICINE

## 2021-12-16 PROCEDURE — 93000 ELECTROCARDIOGRAM COMPLETE: CPT | Performed by: INTERNAL MEDICINE

## 2021-12-16 NOTE — PROGRESS NOTES
Chief Complaint   Patient presents with    Coronary Artery Disease       Patient Active Problem List    Diagnosis Date Noted    Type 1 diabetes mellitus ([facility] 10/09/2016     Priority: High     Class: Chronic    Acquired hypothyroidism 10/09/2016     Priority: Medium     Class: Chronic    Sciatica of left side 10/09/2016     Priority: Medium     Class: Chronic    CAD (coronary artery disease) 11/16/2021     Overview Note:     A. ETT-N 11/29/21 ([address]): 10 METS, angina, non diagnostic EKG, small reversible akiko-apical defect  EF 82%      Thyroid disease     PSVT (paroxysmal supraventricular tachycardia) ([facility] 09/19/2018     Overview Note:     A 6 beat run on a CardioNet 2018. Current Outpatient Medications   Medication Sig Dispense Refill    atorvastatin (LIPITOR) 40 MG tablet Take 1 tablet by mouth daily 30 tablet 5    thyroid (ARMOUR THYROID) 90 MG tablet [place] Thyroid 90 mg tablet   Take 1 tablet every day by oral route for 90 days.  clindamycin (CLEOCIN T) 1 % lotion Apply topically as needed Apply topically 2 times daily. Not using      insulin regular (HUMULIN R;NOVOLIN R) 100 UNIT/ML injection Inject under the skin 10 units in the morning and 10 units in the evening. Uses sliding scale based on blood sugar      insulin NPH (HUMULIN N) 100 UNIT/ML injection vial Inject under the skin 5 units in the morning and 5 units in the evening      vitamin D (ERGOCALCIFEROL) 22242 UNITS CAPS capsule Take 50,000 Units by mouth once a week. sunday      metoprolol succinate (TOPROL XL) 50 MG extended release tablet Take 1 tablet by mouth daily (Patient not taking: Reported on 12/16/2021) 30 tablet 5     No current facility-administered medications for this visit.         No Known Allergies    Vitals:    12/16/21 1119   BP: (!) 173/88   Pulse: 78   Resp: 16   Weight: 150 lb 9.6 oz (68.3 kg)   Height: 5' 3\" (1.6 m)       Social History     Socioeconomic History    Marital status:      Spouse name: Shantel Carr Number of children: 2    Years of education: Not on file    Highest education level: Not on file   Occupational History    Occupation:      Comment: [employer]   Tobacco Use    Smoking status: Never Smoker    Smokeless tobacco: Never Used   Vaping Use    Vaping Use: Never used   Substance and Sexual Activity    Alcohol use: No    Drug use: No    Sexual activity: Not on file   Other Topics Concern    Not on file   Social History Narrative    [place]  in a house     Social Determinants of Health     Financial Resource Strain:     Difficulty of Paying Living Expenses: Not on file   Food Insecurity:     Worried About Running Out of Food in the Last Year: Not on file    Quirino of Food in the Last Year: Not on file   Transportation Needs:     Lack of Transportation (Medical): Not on file    Lack of Transportation (Non-Medical):  Not on file   Physical Activity:     Days of Exercise per Week: Not on file    Minutes of Exercise per Session: Not on file   Stress:     Feeling of Stress : Not on file   Social Connections:     Frequency of Communication with Friends and Family: Not on file    Frequency of Social Gatherings with Friends and Family: Not on file    Attends Religion Services: Not on file    Active Member of [address] [place] [address] [employer] or Organizations: Not on file    Attends Club or Organization Meetings: Not on file    Marital Status: Not on file   Intimate Partner Violence:     Fear of Current or Ex-Partner: Not on file    Emotionally Abused: Not on file    Physically Abused: Not on file    Sexually Abused: Not on file   Housing Stability:     Unable to Pay for Housing in the Last Year: Not on file    Number of [place] in the Last Year: Not on file    Unstable Housing in the Last Year: Not on file       Family History   Problem Relation Age of Onset    High Blood Pressure Mother     Arthritis Mother     Arthritis Father     High Blood Pressure Father     No Known Problems Sister     No Known Problems Sister     No Known Problems Brother     No Known Problems Son     No Known Problems Son     No Known Problems Brother     No Known Problems Brother     No Known Problems Brother          SUBJECTIVE: Joss Rocha presents to the office today for f/u after stress testing     (Sen by dr Myles Cedeno in 2018 for benign palpitations, normal echo and only 6 beat SVT noted. Demurred on BB recommendation\"    . Active lady. She complains of exertional chest pressure/discomfort and denies rest   chest pain, claudication, dyspnea, fatigue, irregular heart beat, lower extremity edema, near-syncope, orthopnea, palpitations, paroxysmal nocturnal dyspnea, syncope and tachypnea. Non smoker. Home BP readings good. On recent ETT had chest discomfort at peak exercise that was likely angina, and abnormal nuclear scan  Has stated statin as advised, but not beta blocker        Review of Systems:   Heart: as above   Lungs: as above   Eyes: denies changes in vision or discharge. Ears: denies changes in hearing or pain. Nose: denies epistaxis or masses   Throat: denies sore throat or trouble swallowing. Neuro: denies numbness, tingling, tremors. Skin: denies rashes or itching. : denies hematuria, dysuria   GI: denies vomiting, diarrhea   Psych: denies mood changed, anxiety, depression. all others negative. Physical Exam   BP (!) 173/88   Pulse 78   Resp 16   Ht 5' 3\" (1.6 m)   Wt 150 lb 9.6 oz (68.3 kg)   LMP 06/17/2013   BMI 26.68 kg/m²   Constitutional: Oriented to person, place, and time. Well-developed and well-nourished. No distress. Head: Normocephalic and atraumatic. Eyes: EOM are normal. Pupils are equal, round, and reactive to light. Neck: Normal range of motion. Neck supple. No hepatojugular reflux and no JVD present. Carotid bruit is not present.    Cardiovascular: Normal rate, regular rhythm, normal heart sounds and intact distal pulses. Exam reveals no gallop and no friction rub. No murmur heard. Pulmonary/Chest: Effort normal and breath sounds normal. No respiratory distress. No wheezes. No rales. Abdominal: Soft. Bowel sounds are normal. No distension and no mass. No tenderness. No rebound and no guarding. Musculoskeletal: Normal range of motion. No edema and no tenderness. Neurological: Alert and oriented to person, place, and time. Skin: Skin is warm and dry. No rash noted. Not diaphoretic. No erythema. Psychiatric: Normal mood and affect.  Behavior is normal.     EKG:  normal sinus rhythm, rate 78, normal .    ASSESSMENT AND PLAN:  Patient Active Problem List   Diagnosis    Type 1 diabetes mellitus (HCC)    Acquired hypothyroidism    Sciatica of left side    PSVT (paroxysmal supraventricular tachycardia) (HCC)    Thyroid disease    CAD (coronary artery disease)     Patient with symptoms suspicious for exertional angina, stable, which were provoked on stress test and accompanied by mild reversible defect  Parameters suggest initial strategy medical  Discussed rationale for beta blocker and high intensity statin    OV one month      Michael Ferguson M.D  [facility]

## 2022-02-11 ENCOUNTER — OFFICE VISIT (OUTPATIENT)
Dept: CARDIOLOGY CLINIC | Age: 57
End: 2022-02-11
Payer: COMMERCIAL

## 2022-02-11 VITALS
HEART RATE: 63 BPM | BODY MASS INDEX: 27.14 KG/M2 | RESPIRATION RATE: 16 BRPM | DIASTOLIC BLOOD PRESSURE: 94 MMHG | WEIGHT: 153.2 LBS | SYSTOLIC BLOOD PRESSURE: 158 MMHG | HEIGHT: 63 IN

## 2022-02-11 DIAGNOSIS — I25.119 CORONARY ARTERY DISEASE WITH ANGINA PECTORIS, UNSPECIFIED VESSEL OR LESION TYPE, UNSPECIFIED WHETHER NATIVE OR TRANSPLANTED HEART (HCC): Primary | ICD-10-CM

## 2022-02-11 DIAGNOSIS — E78.5 HYPERLIPIDEMIA, UNSPECIFIED HYPERLIPIDEMIA TYPE: ICD-10-CM

## 2022-02-11 PROCEDURE — 93000 ELECTROCARDIOGRAM COMPLETE: CPT | Performed by: INTERNAL MEDICINE

## 2022-02-11 PROCEDURE — 99215 OFFICE O/P EST HI 40 MIN: CPT | Performed by: INTERNAL MEDICINE

## 2022-02-11 RX ORDER — TIMOLOL MALEATE 5 MG/ML
SOLUTION/ DROPS OPHTHALMIC
COMMUNITY
Start: 2022-01-21

## 2022-02-11 RX ORDER — LATANOPROST 50 UG/ML
SOLUTION/ DROPS OPHTHALMIC
COMMUNITY
Start: 2021-11-19

## 2022-02-11 NOTE — PROGRESS NOTES
Chief Complaint   Patient presents with    Coronary Artery Disease    Hyperlipidemia       Patient Active Problem List    Diagnosis Date Noted    Type 1 diabetes mellitus ([facility] 10/09/2016     Priority: High     Class: Chronic    Acquired hypothyroidism 10/09/2016     Priority: Medium     Class: Chronic    Sciatica of left side 10/09/2016     Priority: Medium     Class: Chronic    HLD (hyperlipidemia) 02/11/2022    CAD (coronary artery disease) 11/16/2021     Overview Note:     A. ETT-N 11/29/21 ([address]): 10 METS, angina, non diagnostic EKG, small reversible akiko-apical defect  EF 82%      Thyroid disease     PSVT (paroxysmal supraventricular tachycardia) ([facility] 09/19/2018     Overview Note:     A 6 beat run on a CardioNet 2018. Current Outpatient Medications   Medication Sig Dispense Refill    timolol (TIMOPTIC) 0.5 % ophthalmic solution       latanoprost (XALATAN) 0.005 % ophthalmic solution       atorvastatin (LIPITOR) 40 MG tablet Take 1 tablet by mouth daily 30 tablet 5    metoprolol succinate (TOPROL XL) 50 MG extended release tablet Take 1 tablet by mouth daily 30 tablet 5    thyroid (ARMOUR THYROID) 90 MG tablet [place] Thyroid 90 mg tablet   Take 1 tablet every day by oral route for 90 days.  clindamycin (CLEOCIN T) 1 % lotion Apply topically as needed Apply topically 2 times daily. Not using      insulin regular (HUMULIN R;NOVOLIN R) 100 UNIT/ML injection Inject under the skin 10 units in the morning and 10 units in the evening. Uses sliding scale based on blood sugar      insulin NPH (HUMULIN N) 100 UNIT/ML injection vial Inject under the skin 5 units in the morning and 5 units in the evening      vitamin D (ERGOCALCIFEROL) 76251 UNITS CAPS capsule Take 50,000 Units by mouth once a week. sunday       No current facility-administered medications for this visit.         No Known Allergies    Vitals:    02/11/22 1119   BP: (!) 158/94   Pulse: 63   Resp: 16   Weight: 153 lb 3.2 oz (69.5 kg)   Height: 5' 3\" (1.6 m)       Social History     Socioeconomic History    Marital status:      Spouse name: Denise Perea Number of children: 2    Years of education: Not on file    Highest education level: Not on file   Occupational History    Occupation:      Comment: [employer]   Tobacco Use    Smoking status: Never Smoker    Smokeless tobacco: Never Used   Vaping Use    Vaping Use: Never used   Substance and Sexual Activity    Alcohol use: No    Drug use: No    Sexual activity: Not on file   Other Topics Concern    Not on file   Social History Narrative    [place]  in a house     Social Determinants of Health     Financial Resource Strain:     Difficulty of Paying Living Expenses: Not on file   Food Insecurity:     Worried About Running Out of Food in the Last Year: Not on file    Quirino of Food in the Last Year: Not on file   Transportation Needs:     Lack of Transportation (Medical): Not on file    Lack of Transportation (Non-Medical):  Not on file   Physical Activity:     Days of Exercise per Week: Not on file    Minutes of Exercise per Session: Not on file   Stress:     Feeling of Stress : Not on file   Social Connections:     Frequency of Communication with Friends and Family: Not on file    Frequency of Social Gatherings with Friends and Family: Not on file    Attends Hoahaoism Services: Not on file    Active Member of [address] [place] [address] or Organizations: Not on file    Attends Club or Organization Meetings: Not on file    Marital Status: Not on file   Intimate Partner Violence:     Fear of Current or Ex-Partner: Not on file    Emotionally Abused: Not on file    Physically Abused: Not on file    Sexually Abused: Not on file   Housing Stability:     Unable to Pay for Housing in the Last Year: Not on file    Number of [place] in the Last Year: Not on file    Unstable Housing in the Last Year: Not on file       Family History Problem Relation Age of Onset    High Blood Pressure Mother     Arthritis Mother     Arthritis Father     High Blood Pressure Father     No Known Problems Sister     No Known Problems Sister     No Known Problems Brother     No Known Problems Son     No Known Problems Son     No Known Problems Brother     No Known Problems Brother     No Known Problems Brother          SUBJECTIVE: Amie Schaefer presents to the office today for f/u after initiation of high intensity statin and beta blocker  Claims a litany of side effects. Difficult historian      She complains of exertional chest pressure/discomfort and denies rest   chest pain, claudication, dyspnea, fatigue, irregular heart beat, lower extremity edema, near-syncope, orthopnea, palpitations, paroxysmal nocturnal dyspnea, syncope and tachypnea. Non smoker. Home BP readings good. On recent ETT had chest discomfort at peak exercise that was likely angina, and abnormal nuclear scan          Review of Systems:   Heart: as above   Lungs: as above   Eyes: denies changes in vision or discharge. Ears: denies changes in hearing or pain. Nose: denies epistaxis or masses   Throat: denies sore throat or trouble swallowing. Neuro: denies numbness, tingling, tremors. Skin: denies rashes or itching. : denies hematuria, dysuria   GI: denies vomiting, diarrhea   Psych: denies mood changed, anxiety, depression. all others negative. Physical Exam   BP (!) 158/94   Pulse 63   Resp 16   Ht 5' 3\" (1.6 m)   Wt 153 lb 3.2 oz (69.5 kg)   LMP 06/17/2013   BMI 27.14 kg/m²   Constitutional: Oriented to person, place, and time. Well-developed and well-nourished. No distress. Head: Normocephalic and atraumatic. Eyes: EOM are normal. Pupils are equal, round, and reactive to light. Neck: Normal range of motion. Neck supple. No hepatojugular reflux and no JVD present. Carotid bruit is not present.    Cardiovascular: Normal rate, regular rhythm, normal heart sounds and intact distal pulses. Exam reveals no gallop and no friction rub. No murmur heard. Pulmonary/Chest: Effort normal and breath sounds normal. No respiratory distress. No wheezes. No rales. Abdominal: Soft. Bowel sounds are normal. No distension and no mass. No tenderness. No rebound and no guarding. Musculoskeletal: Normal range of motion. No edema and no tenderness. Neurological: Alert and oriented to person, place, and time. Skin: Skin is warm and dry. No rash noted. Not diaphoretic. No erythema. Psychiatric: Normal mood and affect. Behavior is normal.     EKG:  normal sinus rhythm, normal .    ASSESSMENT AND PLAN:  Patient Active Problem List   Diagnosis    Type 1 diabetes mellitus (HCC)    Acquired hypothyroidism    Sciatica of left side    PSVT (paroxysmal supraventricular tachycardia) (HCC)    Thyroid disease    CAD (coronary artery disease)    HLD (hyperlipidemia)     Diabetic patient with symptoms not incompatible for exertional angina, stable, which were provoked on stress test and accompanied by mild reversible defect  Attempt to Rx medically come up against a claim of a varied multitude of side effects. Difficult historian   Cardiac catheterization technique was discussed with the patient. The risks of the procedure, including myocardial infarction, stroke, death, vascular or renal injury, bleeding, or the need for emergency surgery were discussed.     Start ANIBAL Stein M.D  [address] [facility]

## 2022-03-04 ENCOUNTER — TELEPHONE (OUTPATIENT)
Dept: CARDIAC CATH/INVASIVE PROCEDURES | Age: 57
End: 2022-03-04

## 2022-03-07 ENCOUNTER — HOSPITAL ENCOUNTER (OUTPATIENT)
Dept: CARDIAC CATH/INVASIVE PROCEDURES | Age: 57
Discharge: HOME | End: 2022-03-07
Attending: INTERNAL MEDICINE | Admitting: INTERNAL MEDICINE
Payer: COMMERCIAL

## 2022-03-07 VITALS
TEMPERATURE: 97.4 F | BODY MASS INDEX: 25.61 KG/M2 | DIASTOLIC BLOOD PRESSURE: 88 MMHG | OXYGEN SATURATION: 98 % | SYSTOLIC BLOOD PRESSURE: 179 MMHG | WEIGHT: 150 LBS | HEIGHT: 64 IN | RESPIRATION RATE: 18 BRPM | HEART RATE: 68 BPM

## 2022-03-07 DIAGNOSIS — Z98.890 S/P CARDIAC CATH: ICD-10-CM

## 2022-03-07 LAB
ABO/RH: NORMAL
ANION GAP SERPL CALCULATED.3IONS-SCNC: 11 MMOL/L (ref 7–16)
ANTIBODY SCREEN: NORMAL
BASOPHILS ABSOLUTE: 0.05 E9/L (ref 0–0.2)
BASOPHILS RELATIVE PERCENT: 0.5 % (ref 0–2)
BUN BLDV-MCNC: 15 MG/DL (ref 6–20)
CALCIUM SERPL-MCNC: 9.7 MG/DL (ref 8.6–10.2)
CHLORIDE BLD-SCNC: 101 MMOL/L (ref 98–107)
CO2: 25 MMOL/L (ref 22–29)
CREAT SERPL-MCNC: 0.7 MG/DL (ref 0.5–1)
EOSINOPHILS ABSOLUTE: 0.31 E9/L (ref 0.05–0.5)
EOSINOPHILS RELATIVE PERCENT: 3.2 % (ref 0–6)
GFR AFRICAN AMERICAN: >60
GFR NON-AFRICAN AMERICAN: >60 ML/MIN/1.73
GLUCOSE BLD-MCNC: 184 MG/DL (ref 74–99)
HCT VFR BLD CALC: 37.6 % (ref 34–48)
HEMOGLOBIN: 12.1 G/DL (ref 11.5–15.5)
IMMATURE GRANULOCYTES #: 0.04 E9/L
IMMATURE GRANULOCYTES %: 0.4 % (ref 0–5)
LV EF: 65 %
LVEF MODALITY: NORMAL
LYMPHOCYTES ABSOLUTE: 2.2 E9/L (ref 1.5–4)
LYMPHOCYTES RELATIVE PERCENT: 22.6 % (ref 20–42)
MCH RBC QN AUTO: 29.9 PG (ref 26–35)
MCHC RBC AUTO-ENTMCNC: 32.2 % (ref 32–34.5)
MCV RBC AUTO: 92.8 FL (ref 80–99.9)
MONOCYTES ABSOLUTE: 0.73 E9/L (ref 0.1–0.95)
MONOCYTES RELATIVE PERCENT: 7.5 % (ref 2–12)
NEUTROPHILS ABSOLUTE: 6.41 E9/L (ref 1.8–7.3)
NEUTROPHILS RELATIVE PERCENT: 65.8 % (ref 43–80)
PDW BLD-RTO: 12.5 FL (ref 11.5–15)
PLATELET # BLD: 313 E9/L (ref 130–450)
PMV BLD AUTO: 9.9 FL (ref 7–12)
POTASSIUM SERPL-SCNC: 4.3 MMOL/L (ref 3.5–5)
RBC # BLD: 4.05 E12/L (ref 3.5–5.5)
SODIUM BLD-SCNC: 137 MMOL/L (ref 132–146)
WBC # BLD: 9.7 E9/L (ref 4.5–11.5)

## 2022-03-07 PROCEDURE — C1894 INTRO/SHEATH, NON-LASER: HCPCS

## 2022-03-07 PROCEDURE — 85025 COMPLETE CBC W/AUTO DIFF WBC: CPT

## 2022-03-07 PROCEDURE — 2720000010 HC SURG SUPPLY STERILE

## 2022-03-07 PROCEDURE — 36415 COLL VENOUS BLD VENIPUNCTURE: CPT

## 2022-03-07 PROCEDURE — 86901 BLOOD TYPING SEROLOGIC RH(D): CPT

## 2022-03-07 PROCEDURE — 86900 BLOOD TYPING SEROLOGIC ABO: CPT

## 2022-03-07 PROCEDURE — 93458 L HRT ARTERY/VENTRICLE ANGIO: CPT | Performed by: INTERNAL MEDICINE

## 2022-03-07 PROCEDURE — 2500000003 HC RX 250 WO HCPCS

## 2022-03-07 PROCEDURE — 86850 RBC ANTIBODY SCREEN: CPT

## 2022-03-07 PROCEDURE — 2709999900 HC NON-CHARGEABLE SUPPLY

## 2022-03-07 PROCEDURE — C1887 CATHETER, GUIDING: HCPCS

## 2022-03-07 PROCEDURE — C1769 GUIDE WIRE: HCPCS

## 2022-03-07 PROCEDURE — 80048 BASIC METABOLIC PNL TOTAL CA: CPT

## 2022-03-07 PROCEDURE — 6360000002 HC RX W HCPCS

## 2022-03-07 PROCEDURE — 93458 L HRT ARTERY/VENTRICLE ANGIO: CPT

## 2022-03-07 RX ORDER — SODIUM CHLORIDE 9 MG/ML
INJECTION, SOLUTION INTRAVENOUS ONCE
Status: DISCONTINUED | OUTPATIENT
Start: 2022-03-07 | End: 2022-03-08 | Stop reason: HOSPADM

## 2022-03-07 RX ORDER — ACETAMINOPHEN 325 MG/1
650 TABLET ORAL EVERY 4 HOURS PRN
Status: DISCONTINUED | OUTPATIENT
Start: 2022-03-07 | End: 2022-03-08 | Stop reason: HOSPADM

## 2022-03-07 NOTE — PROCEDURES
Procedure:    1. Left heart cath    Physician: Sandra Tuttle DO. Assistant: none    Indication: Recurring chest discomfort. Abnormal stress test.  AUC: 8  AUC indication: 4    Complication: None. Sedation: Intravenous Versed. Anesthesia: Xylocaine, fentanyl     Sedation time: I was present for sedation and ministration at 07 45 and I ended sedation at 805 for a total face-to-face sedation time of 20 minutes. Estimated blood loss: Minimal    Specimens: none    Contrast used: 60 cc    Hemodynamics:  Opening Aortic pressure: 979/81  LV systolic pressure: 461  LVEDP: 15  No significant gradient across the aortic valve    Angiographic Results/findings:  Left Main: No angiographically significant stenosis. LAD: No angiographically significant stenosis. D1: 1.5 mm vessel. No angiographically significant stenosis. D2: 1 to 1.5 mm vessel. Ostial 90% stenosis. D3: 1 to 1.5 mm vessel. No angiographically significant stenosis. Cx: Ostial 10 to 20% stenosis. OM1: No angiographically significant stenosis. OM2: No angiographically significant stenosis. RCA: Dominant. No angiographically significant stenosis. PDA: Very small vessel. PLB: No angiographically significant stenosis. LV: Normal LV size and systolic function. No wall motion abnormalities. Ejection fraction 65%    Procedure:   After obtaining informed consent the patient was taken to the cardiac Cath Lab where the area over the right radial artery was prepped and draped in a sterile fashion. Using ultrasound guidance and a micropuncture technique a 6 [place] slender rain sheath was placed in the right radial artery. This was aspirated & flushed several times throughout the procedure. This was medicated with verapamil and nitroglycerin. They were given heparin systemically. A 5 [place] TIG catheter was advanced over a wire to the root of the aorta. It was aspirated & flushed with saline. Pressures were obtained.   It was filled with contrast.  This was not able to manipulate into the left main coronary artery. .  A TIG catheter was then manipulated into the right coronary artery and 2 orthogonal views were then obtained. A reshaped L3 5 was used and was also not able to manipulate into the left main coronary artery. A 6 [place]uv[place] L3 0.0 was used and was ultimately able to manipulate into the left main coronary artery. 4 orthogonal views were obtained. A 5 [place]uv[place] angled pigtail was then advanced & manipulated into the left ventricle. This was then aspirated & flushed with saline & pressures were obtained. An BRUNER view was then obtained. The catheter was then aspirated & flushed with saline once again & pull back pressures were then obtained across the aortic vlave. The right radial artery sheath was removed and a vasc band was then placed with good patent hemostasis. They tolerated the procedure well with no complications. Note: This report was completed using computerized voice recognition software. Every effort has been made to ensure accuracy, however; and invert and computerized transcription errors may be present.

## 2022-03-08 ENCOUNTER — TELEPHONE (OUTPATIENT)
Dept: CARDIOLOGY CLINIC | Age: 57
End: 2022-03-08

## 2022-03-08 RX ORDER — RANOLAZINE 500 MG/1
500 TABLET, EXTENDED RELEASE ORAL 2 TIMES DAILY
Qty: 60 TABLET | Refills: 5 | Status: SHIPPED | OUTPATIENT
Start: 2022-03-08

## 2022-03-08 NOTE — TELEPHONE ENCOUNTER
MD Sebastien Camacho  Give her a call tmrw   Let her know dr garcia reviewed the films of dr Arjun Ge and agree that minimal blockages are present   She should stay on the cholesteral med   If she doesn't want the metoprolol we can try her on ranexa 500 bid and see her in a month     Patient notified and instructed on cath and medications.   She is going to try the Ranexa 500 bid and she will be set up for a month appt

## 2022-06-14 LAB
ALBUMIN SERPL-MCNC: 4.7 G/DL (ref 3.5–5.2)
ALP BLD-CCNC: 129 U/L (ref 35–104)
ALT SERPL-CCNC: 33 U/L (ref 0–32)
ANION GAP SERPL CALCULATED.3IONS-SCNC: 16 MMOL/L (ref 7–16)
AST SERPL-CCNC: 37 U/L (ref 0–31)
BASOPHILS ABSOLUTE: 0.05 E9/L (ref 0–0.2)
BASOPHILS RELATIVE PERCENT: 0.6 % (ref 0–2)
BILIRUB SERPL-MCNC: 0.3 MG/DL (ref 0–1.2)
BUN BLDV-MCNC: 15 MG/DL (ref 6–20)
CALCIUM SERPL-MCNC: 9.9 MG/DL (ref 8.6–10.2)
CHLORIDE BLD-SCNC: 98 MMOL/L (ref 98–107)
CO2: 23 MMOL/L (ref 22–29)
CREAT SERPL-MCNC: 0.7 MG/DL (ref 0.5–1)
EOSINOPHILS ABSOLUTE: 0.18 E9/L (ref 0.05–0.5)
EOSINOPHILS RELATIVE PERCENT: 2.3 % (ref 0–6)
GFR AFRICAN AMERICAN: >60
GFR NON-AFRICAN AMERICAN: >60 ML/MIN/1.73
GLUCOSE BLD-MCNC: 196 MG/DL (ref 74–99)
HBA1C MFR BLD: 8.1 % (ref 4–5.6)
HCT VFR BLD CALC: 39.8 % (ref 34–48)
HEMOGLOBIN: 12.5 G/DL (ref 11.5–15.5)
IMMATURE GRANULOCYTES #: 0.03 E9/L
IMMATURE GRANULOCYTES %: 0.4 % (ref 0–5)
LYMPHOCYTES ABSOLUTE: 2.17 E9/L (ref 1.5–4)
LYMPHOCYTES RELATIVE PERCENT: 27.2 % (ref 20–42)
MCH RBC QN AUTO: 29.8 PG (ref 26–35)
MCHC RBC AUTO-ENTMCNC: 31.4 % (ref 32–34.5)
MCV RBC AUTO: 95 FL (ref 80–99.9)
MONOCYTES ABSOLUTE: 0.6 E9/L (ref 0.1–0.95)
MONOCYTES RELATIVE PERCENT: 7.5 % (ref 2–12)
NEUTROPHILS ABSOLUTE: 4.95 E9/L (ref 1.8–7.3)
NEUTROPHILS RELATIVE PERCENT: 62 % (ref 43–80)
PDW BLD-RTO: 12.3 FL (ref 11.5–15)
PLATELET # BLD: 351 E9/L (ref 130–450)
PMV BLD AUTO: 10.4 FL (ref 7–12)
POTASSIUM SERPL-SCNC: 4.6 MMOL/L (ref 3.5–5)
RBC # BLD: 4.19 E12/L (ref 3.5–5.5)
SODIUM BLD-SCNC: 137 MMOL/L (ref 132–146)
TOTAL PROTEIN: 7.3 G/DL (ref 6.4–8.3)
VITAMIN D 25-HYDROXY: 36 NG/ML (ref 30–100)
WBC # BLD: 8 E9/L (ref 4.5–11.5)

## 2022-11-03 ENCOUNTER — OFFICE VISIT (OUTPATIENT)
Dept: ENDOCRINOLOGY | Age: 57
End: 2022-11-03
Payer: COMMERCIAL

## 2022-11-03 VITALS
HEART RATE: 102 BPM | SYSTOLIC BLOOD PRESSURE: 175 MMHG | DIASTOLIC BLOOD PRESSURE: 78 MMHG | OXYGEN SATURATION: 97 % | WEIGHT: 150 LBS | BODY MASS INDEX: 25.75 KG/M2

## 2022-11-03 DIAGNOSIS — E03.9 HYPOTHYROIDISM, UNSPECIFIED TYPE: ICD-10-CM

## 2022-11-03 DIAGNOSIS — R53.82 CHRONIC FATIGUE: ICD-10-CM

## 2022-11-03 DIAGNOSIS — E10.65 TYPE 1 DIABETES MELLITUS WITH HYPERGLYCEMIA (HCC): Primary | ICD-10-CM

## 2022-11-03 LAB — HBA1C MFR BLD: 8 %

## 2022-11-03 PROCEDURE — 99204 OFFICE O/P NEW MOD 45 MIN: CPT | Performed by: INTERNAL MEDICINE

## 2022-11-03 PROCEDURE — 83036 HEMOGLOBIN GLYCOSYLATED A1C: CPT | Performed by: INTERNAL MEDICINE

## 2022-11-03 PROCEDURE — 3052F HG A1C>EQUAL 8.0%<EQUAL 9.0%: CPT | Performed by: INTERNAL MEDICINE

## 2022-11-03 NOTE — PROGRESS NOTES
[address] [place] [address] Department of Endocrinology Diabetes and Metabolism   [address] [facility] [address]   Phone: [phone]  Fax: [phone]    Date of Service: 11/3/2022  Primary Care Physician: Thomas Prado MD  Referring physician: Edna Watt MD  Provider: Greg Teresa MD    Reason for the visit:  DM type 1, Hypothyroidism     History of Present Illness: The history is provided by the patient. No  was used. Accuracy of the patient data is excellent. Radha Berry is a very pleasant 59 y.o. female seen today for diabetes management     Radha Berry was diagnosed with diabetes at age 13 and currently on NPH 5u BID, Regular 15u AM, 5u with dinner   DEXCOM CGM   Most recent A1c results summarized below  Lab Results   Component Value Date/Time    LABA1C 8.0 11/03/2022 01:20 PM    LABA1C 8.1 06/14/2022 06:00 PM    LABA1C 9.3 03/11/2022 12:37 PM       Patient has had no hypoglycemic episodes   The patient has been mindful of what has been eating and following diabetic diet as encouraged  I reviewed current medications and the patient has no issues with diabetes medications  Radha Berry is up to date with eye exam and denied any history of diabetic retinopathy   The patient seeing podiatrist every   And also performs  own feet care  Microvascular complications:  No Retinopathy, Nephropathy or Neuropathy   Macrovascular complications: no CAD, PVD, or Stroke  The patient receives Flushot every year and up to date with the Pneumonia vaccine   The patient refuses Flushot and pneumonia vaccine     Hypothyroidism   [place]  90 mg daily. Patient takes [place]  in the morning at empty stomach, wait one hour before eating , avoid multivitamins containing calcium  or iron with it.     Lab Results   Component Value Date/Time    TSH 0.851 03/11/2022 12:37 PM    T4FREE 1.63 02/11/2020 01:30 PM    I4MEVBZ 9.4 02/11/2020 01:30 PM    FT3 2.7 10/18/2010 02:24 PM    C3CCQUN 85.04 2020 01:30 PM         PAST MEDICAL HISTORY   Past Medical History:   Diagnosis Date    Acquired hypothyroidism 10/9/2016    Closed burst fracture of lumbar vertebra ([facility] 10/9/2016    L3, fixation surgically    Closed fracture of left olecranon process 10/9/2016    Closed fracture of right patella 10/9/2016    ORIF    Closed wedge compression fracture of first lumbar vertebra with routine healing 10/9/2016    Kyphoplasty    Heart murmur     no problems. Does not see cardiologist    Hyperlipidemia     Sciatica of left side 10/9/2016    Thyroid disease     Type 1 diabetes mellitus ([facility] 10/9/2016       PAST SURGICAL HISTORY   Past Surgical History:   Procedure Laterality Date    BACK SURGERY  2016    CARPAL TUNNEL RELEASE  2012    bilateral     SECTION      x 2    COLONOSCOPY  13    EYE SURGERY  2017    cataract & lense placement    KNEE SURGERY         SOCIAL HISTORY   Tobacco:   reports that she has never smoked. She has never used smokeless tobacco.  Alcohol:   reports no history of alcohol use. Drugs:   reports no history of drug use. FAMILY HISTORY   Family History   Problem Relation Age of Onset    High Blood Pressure Mother     Arthritis Mother     Arthritis Father     High Blood Pressure Father     No Known Problems Sister     No Known Problems Sister     No Known Problems Brother     No Known Problems Son     No Known Problems Son     No Known Problems Brother     No Known Problems Brother     No Known Problems Brother        ALLERGIES AND DRUG REACTIONS   No Known Allergies    CURRENT MEDICATIONS   Current Outpatient Medications   Medication Sig Dispense Refill    thyroid (ARMOUR) 90 MG tablet [place] Thyroid 90 mg tablet   Take 1 tablet every day by oral route for 90 days. insulin regular (HUMULIN R;NOVOLIN R) 100 UNIT/ML injection Inject under the skin 10 units in the morning and 10 units in the evening.  Uses sliding scale based on blood sugar insulin NPH (HUMULIN N;NOVOLIN N) 100 UNIT/ML injection vial Inject under the skin 5 units in the morning and 5 units in the evening      vitamin D (ERGOCALCIFEROL) 27162 UNITS CAPS capsule Take 50,000 Units by mouth once a week. sunday      ranolazine (RANEXA) 500 MG extended release tablet Take 1 tablet by mouth 2 times daily (Patient not taking: Reported on 11/3/2022) 60 tablet 5    timolol (TIMOPTIC) 0.5 % ophthalmic solution       latanoprost (XALATAN) 0.005 % ophthalmic solution       atorvastatin (LIPITOR) 40 MG tablet Take 1 tablet by mouth daily (Patient not taking: Reported on 11/3/2022) 30 tablet 5    clindamycin (CLEOCIN T) 1 % lotion Apply topically as needed Apply topically 2 times daily. Not using       No current facility-administered medications for this visit. Review of Systems  Constitutional: No fever, no chills, no diaphoresis, no generalized weakness. HEENT: No blurred vision, No sore throat, no ear pain, no hair loss  Neck: denied any neck swelling, difficulty swallowing,   Cardio-pulmonary: No CP, SOB or palpitation, No orthopnea or PND. No cough or wheezing. GI: No N/V/D, no constipation, No abdominal pain, no melena or hematochezia   : Denied any dysuria, hematuria, flank pain, discharge, or incontinence. Skin: denied any rash, ulcer, Hirsute, or hyperpigmentation. MSK: denied any joint deformity, joint pain/swelling, muscle pain, or back pain.   Neuro: no numbness, no tingling, no weakness, _    OBJECTIVE    BP (!) 175/78   Pulse (!) 102   Wt 150 lb (68 kg)   LMP 06/17/2013   SpO2 97%   BMI 25.75 kg/m²   BP Readings from Last 4 Encounters:   11/03/22 (!) 175/78   03/07/22 (!) 179/88   02/11/22 (!) 158/94   12/16/21 (!) 173/88     Wt Readings from Last 6 Encounters:   11/03/22 150 lb (68 kg)   03/07/22 150 lb (68 kg)   02/11/22 153 lb 3.2 oz (69.5 kg)   12/16/21 150 lb 9.6 oz (68.3 kg)   11/29/21 150 lb (68 kg)   11/16/21 150 lb 3.2 oz (68.1 kg)       Physical examination:  General: awake alert, oriented x3, no abnormal position or movements. HEENT: normocephalic non-traumatic, no exophthalmos   Neck: supple, no LN enlargement, no thyromegaly, no thyroid tenderness, no JVD. Pulm: Clear equal air entry no added sounds, no wheezing or rhonchi    CVS: S1 + S2, no murmur, no heave. Dorsalis pedis pulse palpable   Abd: soft lax, no tenderness, no organomegaly, audible bowel sounds. Skin: warm, no lesions, no rash.  No callus, no Ulcers, No acanthosis nigricans  Musculoskeletal: No back tenderness, no kyphosis/scoliosis    Neuro: CN intact, Monofilament sensation decreased bilateral , muscle power normal  Psych: normal mood, and affect    Review of Laboratory Data:  I personally reviewed the following lab:  Lab Results   Component Value Date/Time    WBC 8.0 06/14/2022 08:00 PM    RBC 4.19 06/14/2022 08:00 PM    HGB 12.5 06/14/2022 08:00 PM    HCT 39.8 06/14/2022 08:00 PM    MCV 95.0 06/14/2022 08:00 PM    MCH 29.8 06/14/2022 08:00 PM    MCHC 31.4 (L) 06/14/2022 08:00 PM    RDW 12.3 06/14/2022 08:00 PM     06/14/2022 08:00 PM    MPV 10.4 06/14/2022 08:00 PM      Lab Results   Component Value Date/Time     06/14/2022 08:00 PM    K 4.6 06/14/2022 08:00 PM    CO2 23 06/14/2022 08:00 PM    BUN 15 06/14/2022 08:00 PM    CREATININE 0.7 06/14/2022 08:00 PM    CALCIUM 9.9 06/14/2022 08:00 PM    LABGLOM >60 06/14/2022 08:00 PM    GFRAA >60 06/14/2022 08:00 PM      Lab Results   Component Value Date/Time    TSH 0.851 03/11/2022 12:37 PM    T4FREE 1.63 02/11/2020 01:30 PM    L7IPGBV 9.4 02/11/2020 01:30 PM    FT3 2.7 10/18/2010 02:24 PM    L7NBMDS 85.04 02/11/2020 01:30 PM     Lab Results   Component Value Date/Time    LABA1C 8.0 11/03/2022 01:20 PM    GLUCOSE 196 06/14/2022 08:00 PM    GLUCOSE 134 05/22/2012 02:33 PM    MALBCR - 01/26/2021 12:00 PM    LABMICR <12.0 01/26/2021 12:00 PM    LABCREA 60 01/26/2021 12:00 PM     Lab Results   Component Value Date/Time    LABA1C 8.0 11/03/2022 01:20 PM    LABA1C 8.1 06/14/2022 06:00 PM    LABA1C 9.3 03/11/2022 12:37 PM     Lab Results   Component Value Date/Time    TRIG 135 03/11/2022 12:37 PM    HDL 60 03/11/2022 12:37 PM    LDLCALC 132 03/11/2022 12:37 PM    CHOL 219 03/11/2022 12:37 PM     Lab Results   Component Value Date/Time    VITD25 30 09/21/2022 01:00 PM    VITD25 36 06/14/2022 08:00 PM       ASSESSMENT & RECOMMENDATIONS   Solomon Lu, a 59 y.o.-old female seen in for the following issues     Diabetes Mellitus Type 1     Patient's diabetes is uncontrol   Currently on NPH 5u BID, Regular 15u AM, 5u with dinner   Will order Omnipod insulin pump  Pt already using DEXCOM CGM   Discussed with patient A1c and blood sugar goals   Patient will need routine diabetes maintenance and prevention  Diabetes labs before next visit     Dietary noncompliance  On [place] 90 mg daily  Pt wasn't able to tolerate Synthroid of Levothyroxine in the past  Check TFT       Chronic fatigue   Check TFT  With Type 1 DM and hypothyroidism, will check AM cortisol     I personally reviewed external notes from PCP and other patient's care team providers, and personally interpreted labs associated with the above diagnosis. I also ordered labs to further assess and manage the above addressed medical conditions. Return in about 3 months (around 2/3/2023) for DM type 1, hypothyroidism, VitD deficiency. The above issues were reviewed with the patient who understood and agreed with the plan. Thank you for allowing us to participate in the care of this patient. Please do not hesitate to contact us with any additional questions. Diagnosis Orders   1. Type 1 diabetes mellitus with hyperglycemia (HCC)  POCT glycosylated hemoglobin (Hb A1C)      2. Hypothyroidism, unspecified type  TSH    T4, Free    T4      3.  Chronic fatigue  ACTH    Cortisol Total        Shameka Deluna MD  Endocrinologist, [facility]   [address] [facility] [place] [address]   Phone: [phone]  Fax: [phone]  --------------------------------------------  An electronic signature was used to authenticate this note.  Viviane Anglin MD on 11/3/2022 at 1:33 PM

## 2022-11-04 ENCOUNTER — TELEPHONE (OUTPATIENT)
Dept: ENDOCRINOLOGY | Age: 57
End: 2022-11-04

## 2022-11-04 LAB
CORTISOL TOTAL: 10.71 MCG/DL (ref 2.68–18.4)
T4 FREE: 1.04 NG/DL (ref 0.93–1.7)
T4 TOTAL: 6.8 MCG/DL (ref 4.5–11.7)
TSH SERPL DL<=0.05 MIU/L-ACNC: 0.81 UIU/ML (ref 0.27–4.2)

## 2022-11-04 NOTE — TELEPHONE ENCOUNTER
Endocrine staff,   Please notify pt that I have reviewed your recent results. Great!, thyroid hormones results are within an acceptable range of normal. There is no need for a change in your therapy at this time. also, cortisol level was very good     Please make sure to hear from our Dc Gamez about insulin pump    ------------  Endo staff,  Please give her name to Veronique Mar to get Omnipod-5 pump.  Pt already using [facility]

## 2022-11-08 LAB — ADRENOCORTICOTROPIC HORMONE: 18.2 PG/ML (ref 7.2–63.3)

## 2022-11-11 NOTE — TELEPHONE ENCOUNTER
I spoke to the pt. She was notified. She's currently using the Dexcom. She's not sure about the Omnipod yet. I contacted Claudette Valadez to call the pt to give her more information.

## 2022-11-17 LAB
HPV SAMPLE: NORMAL
HPV TYPE 16: NOT DETECTED
HPV TYPE 18: NOT DETECTED
HPV, HIGH RISK OTHER: NOT DETECTED
INTERPRETATION: NORMAL
SOURCE: NORMAL

## 2023-09-22 ENCOUNTER — HOSPITAL ENCOUNTER (OUTPATIENT)
Age: 58
Discharge: HOME | End: 2023-09-22
Payer: COMMERCIAL

## 2023-09-22 LAB
ALBUMIN SERPL-MCNC: 4.3 G/DL (ref 3.5–5.2)
ALP SERPL-CCNC: 124 U/L (ref 35–104)
ALT SERPL-CCNC: 29 U/L (ref 0–32)
ANION GAP SERPL CALCULATED.3IONS-SCNC: 10 MMOL/L (ref 7–16)
AST SERPL-CCNC: 32 U/L (ref 0–31)
BASOPHILS # BLD: 0.04 K/UL (ref 0–0.2)
BASOPHILS NFR BLD: 1 % (ref 0–2)
BILIRUB SERPL-MCNC: 0.3 MG/DL (ref 0–1.2)
BUN SERPL-MCNC: 18 MG/DL (ref 6–23)
CALCIUM SERPL-MCNC: 10.2 MG/DL (ref 8.6–10.2)
CHLORIDE SERPL-SCNC: 104 MMOL/L (ref 98–107)
CHOLEST SERPL-MCNC: 216 MG/DL
CO2 SERPL-SCNC: 28 MMOL/L (ref 22–29)
CREAT SERPL-MCNC: 0.8 MG/DL (ref 0.5–1)
EOSINOPHIL # BLD: 0.19 K/UL (ref 0.05–0.5)
EOSINOPHILS RELATIVE PERCENT: 2 % (ref 0–6)
ERYTHROCYTE [DISTWIDTH] IN BLOOD BY AUTOMATED COUNT: 12.2 % (ref 11.5–15)
GFR SERPL CREATININE-BSD FRML MDRD: >60 ML/MIN/1.73M2
GLUCOSE SERPL-MCNC: 99 MG/DL (ref 74–99)
HBA1C MFR BLD: 8.7 % (ref 4–5.6)
HCT VFR BLD AUTO: 39.5 % (ref 34–48)
HDLC SERPL-MCNC: 65 MG/DL
HGB BLD-MCNC: 12.6 G/DL (ref 11.5–15.5)
IMM GRANULOCYTES # BLD AUTO: 0.03 K/UL (ref 0–0.58)
IMM GRANULOCYTES NFR BLD: 0 % (ref 0–5)
LDLC SERPL CALC-MCNC: 132 MG/DL
LYMPHOCYTES NFR BLD: 2.35 K/UL (ref 1.5–4)
LYMPHOCYTES RELATIVE PERCENT: 27 % (ref 20–42)
MCH RBC QN AUTO: 30.1 PG (ref 26–35)
MCHC RBC AUTO-ENTMCNC: 31.9 G/DL (ref 32–34.5)
MCV RBC AUTO: 94.5 FL (ref 80–99.9)
MICROALBUMIN UR-MCNC: 22 MG/L (ref 0–19)
MONOCYTES NFR BLD: 0.69 K/UL (ref 0.1–0.95)
MONOCYTES NFR BLD: 8 % (ref 2–12)
NEUTROPHILS NFR BLD: 63 % (ref 43–80)
NEUTS SEG NFR BLD: 5.49 K/UL (ref 1.8–7.3)
PLATELET # BLD AUTO: 383 K/UL (ref 130–450)
PMV BLD AUTO: 9.7 FL (ref 7–12)
POTASSIUM SERPL-SCNC: 5 MMOL/L (ref 3.5–5)
PROT SERPL-MCNC: 7.7 G/DL (ref 6.4–8.3)
RBC # BLD AUTO: 4.18 M/UL (ref 3.5–5.5)
SODIUM SERPL-SCNC: 142 MMOL/L (ref 132–146)
TRIGL SERPL-MCNC: 97 MG/DL
TSH SERPL DL<=0.05 MIU/L-ACNC: 0.47 UIU/ML (ref 0.27–4.2)
VLDLC SERPL CALC-MCNC: 19 MG/DL
WBC OTHER # BLD: 8.8 K/UL (ref 4.5–11.5)

## 2023-09-22 PROCEDURE — 80061 LIPID PANEL: CPT

## 2023-09-22 PROCEDURE — 84443 ASSAY THYROID STIM HORMONE: CPT

## 2023-09-22 PROCEDURE — 83036 HEMOGLOBIN GLYCOSYLATED A1C: CPT

## 2023-09-22 PROCEDURE — 82043 UR ALBUMIN QUANTITATIVE: CPT

## 2023-09-22 PROCEDURE — 85025 COMPLETE CBC W/AUTO DIFF WBC: CPT

## 2023-09-22 PROCEDURE — 80053 COMPREHEN METABOLIC PANEL: CPT

## 2024-03-14 ENCOUNTER — HOSPITAL ENCOUNTER (OUTPATIENT)
Age: 59
Discharge: HOME | End: 2024-03-14
Payer: COMMERCIAL

## 2024-03-14 LAB
ALBUMIN SERPL-MCNC: 4.4 G/DL (ref 3.5–5.2)
ALP SERPL-CCNC: 130 U/L (ref 35–104)
ALT SERPL-CCNC: 31 U/L (ref 0–32)
ANION GAP SERPL CALCULATED.3IONS-SCNC: 10 MMOL/L (ref 7–16)
AST SERPL-CCNC: 33 U/L (ref 0–31)
BASOPHILS # BLD: 0.06 K/UL (ref 0–0.2)
BASOPHILS NFR BLD: 1 % (ref 0–2)
BILIRUB SERPL-MCNC: 0.3 MG/DL (ref 0–1.2)
BUN SERPL-MCNC: 18 MG/DL (ref 6–23)
CALCIUM SERPL-MCNC: 9.6 MG/DL (ref 8.6–10.2)
CHLORIDE SERPL-SCNC: 98 MMOL/L (ref 98–107)
CO2 SERPL-SCNC: 26 MMOL/L (ref 22–29)
CREAT SERPL-MCNC: 0.7 MG/DL (ref 0.5–1)
EOSINOPHIL # BLD: 0.16 K/UL (ref 0.05–0.5)
EOSINOPHILS RELATIVE PERCENT: 2 % (ref 0–6)
ERYTHROCYTE [DISTWIDTH] IN BLOOD BY AUTOMATED COUNT: 12.5 % (ref 11.5–15)
GFR SERPL CREATININE-BSD FRML MDRD: >60 ML/MIN/1.73M2
GLUCOSE SERPL-MCNC: 100 MG/DL (ref 74–99)
HBA1C MFR BLD: 9.1 % (ref 4–5.6)
HCT VFR BLD AUTO: 38.1 % (ref 34–48)
HGB BLD-MCNC: 12 G/DL (ref 11.5–15.5)
IMM GRANULOCYTES # BLD AUTO: 0.03 K/UL (ref 0–0.58)
IMM GRANULOCYTES NFR BLD: 0 % (ref 0–5)
LYMPHOCYTES NFR BLD: 2.6 K/UL (ref 1.5–4)
LYMPHOCYTES RELATIVE PERCENT: 34 % (ref 20–42)
MCH RBC QN AUTO: 30.2 PG (ref 26–35)
MCHC RBC AUTO-ENTMCNC: 31.5 G/DL (ref 32–34.5)
MCV RBC AUTO: 96 FL (ref 80–99.9)
MICROALBUMIN UR-MCNC: 12 MG/L (ref 0–19)
MONOCYTES NFR BLD: 0.56 K/UL (ref 0.1–0.95)
MONOCYTES NFR BLD: 7 % (ref 2–12)
NEUTROPHILS NFR BLD: 56 % (ref 43–80)
NEUTS SEG NFR BLD: 4.27 K/UL (ref 1.8–7.3)
PLATELET # BLD AUTO: 344 K/UL (ref 130–450)
PMV BLD AUTO: 10.3 FL (ref 7–12)
POTASSIUM SERPL-SCNC: 4.8 MMOL/L (ref 3.5–5)
PROT SERPL-MCNC: 7.6 G/DL (ref 6.4–8.3)
RBC # BLD AUTO: 3.97 M/UL (ref 3.5–5.5)
SODIUM SERPL-SCNC: 134 MMOL/L (ref 132–146)
TSH SERPL DL<=0.05 MIU/L-ACNC: 0.49 UIU/ML (ref 0.27–4.2)
WBC OTHER # BLD: 7.7 K/UL (ref 4.5–11.5)

## 2024-03-14 PROCEDURE — 36415 COLL VENOUS BLD VENIPUNCTURE: CPT

## 2024-03-14 PROCEDURE — 80053 COMPREHEN METABOLIC PANEL: CPT

## 2024-03-14 PROCEDURE — 85025 COMPLETE CBC W/AUTO DIFF WBC: CPT

## 2024-03-14 PROCEDURE — 83036 HEMOGLOBIN GLYCOSYLATED A1C: CPT

## 2024-03-14 PROCEDURE — 84443 ASSAY THYROID STIM HORMONE: CPT

## 2024-03-14 PROCEDURE — 82043 UR ALBUMIN QUANTITATIVE: CPT

## 2024-03-18 ENCOUNTER — HOSPITAL ENCOUNTER (OUTPATIENT)
Age: 59
Discharge: HOME | End: 2024-03-18
Payer: COMMERCIAL

## 2024-03-18 LAB
CHOLEST SERPL-MCNC: 212 MG/DL
HDLC SERPL-MCNC: 66 MG/DL
LDLC SERPL CALC-MCNC: 132 MG/DL
TRIGL SERPL-MCNC: 72 MG/DL
VLDLC SERPL CALC-MCNC: 14 MG/DL

## 2024-03-18 PROCEDURE — 80061 LIPID PANEL: CPT

## 2024-03-18 PROCEDURE — 36415 COLL VENOUS BLD VENIPUNCTURE: CPT

## 2024-06-27 ENCOUNTER — OFFICE VISIT (OUTPATIENT)
Dept: VASCULAR SURGERY | Age: 59
End: 2024-06-27
Payer: COMMERCIAL

## 2024-06-27 VITALS — HEART RATE: 72 BPM | SYSTOLIC BLOOD PRESSURE: 138 MMHG | DIASTOLIC BLOOD PRESSURE: 70 MMHG | OXYGEN SATURATION: 96 %

## 2024-06-27 DIAGNOSIS — R09.89 DECREASED DORSALIS PEDIS PULSE: Primary | ICD-10-CM

## 2024-06-27 DIAGNOSIS — R26.2 DIFFICULTY WALKING: ICD-10-CM

## 2024-06-27 DIAGNOSIS — I78.1 SPIDER VEINS: ICD-10-CM

## 2024-06-27 PROCEDURE — 99204 OFFICE O/P NEW MOD 45 MIN: CPT | Performed by: SURGERY

## 2024-06-27 RX ORDER — TRIAMCINOLONE ACETONIDE 1 MG/G
CREAM TOPICAL
COMMUNITY

## 2024-06-27 NOTE — PROGRESS NOTES
her symptoms could be related to her back problems and diabetes mellitus with neuropathy nevertheless, clinically she does have diminished pulse on the right side, recommended her lower extremity arterial Doppler study and then make additional recommendations       All the questions were answered.      Orders Placed This Encounter   Procedures    Vascular lower extremity arterial segmental pressures w PPG             Indicated follow-up: Return if symptoms worsen or fail to improve.

## 2024-08-19 ENCOUNTER — TELEPHONE (OUTPATIENT)
Dept: VASCULAR SURGERY | Age: 59
End: 2024-08-19

## 2024-08-19 ENCOUNTER — HOSPITAL ENCOUNTER (OUTPATIENT)
Dept: CARDIOLOGY | Age: 59
Discharge: HOME | End: 2024-08-21
Attending: SURGERY
Payer: COMMERCIAL

## 2024-08-19 DIAGNOSIS — R09.89 DECREASED DORSALIS PEDIS PULSE: ICD-10-CM

## 2024-08-19 LAB
VAS LEFT ABI: 1.16
VAS LEFT ANKLE BP: 201 MMHG
VAS LEFT ARM BP: 171 MMHG
VAS LEFT CALF PRESSURE: 238 MMHG
VAS LEFT DORSALIS PEDIS BP: 197 MMHG
VAS LEFT PTA BP: 201 MMHG
VAS LEFT TBI: 0.78
VAS LEFT THIGH PRESSURE: 200 MMHG
VAS LEFT TOE PRESSURE: 136 MMHG
VAS RIGHT ABI: 1.1
VAS RIGHT ANKLE BP: 192 MMHG
VAS RIGHT ARM BP: 174 MMHG
VAS RIGHT CALF PRESSURE: 228 MMHG
VAS RIGHT DORSALIS PEDIS BP: 192 MMHG
VAS RIGHT PTA BP: 177 MMHG
VAS RIGHT TBI: 0.79
VAS RIGHT THIGH PRESSURE: 200 MMHG
VAS RIGHT TOE PRESSURE: 137 MMHG

## 2024-08-19 PROCEDURE — 93923 UPR/LXTR ART STDY 3+ LVLS: CPT | Performed by: SURGERY

## 2024-08-19 PROCEDURE — 93923 UPR/LXTR ART STDY 3+ LVLS: CPT

## 2024-08-19 NOTE — TELEPHONE ENCOUNTER
Discussed the patient regarding the lower extremity artery Doppler study, good arterial flow, normal triphasic ankle Doppler tracings, patient was reassured from a vascular perspective, her symptoms could be related to diabetic neuropathy, back problems due to previous back surgery etc. and to discuss with her PCP, all the questions were answered

## 2024-09-17 ENCOUNTER — HOSPITAL ENCOUNTER (OUTPATIENT)
Age: 59
Discharge: HOME | End: 2024-09-17
Payer: COMMERCIAL

## 2024-09-17 LAB
ALBUMIN SERPL-MCNC: 4.5 G/DL (ref 3.5–5.2)
ALP SERPL-CCNC: 137 U/L (ref 35–104)
ALT SERPL-CCNC: 33 U/L (ref 0–32)
ANION GAP SERPL CALCULATED.3IONS-SCNC: 10 MMOL/L (ref 7–16)
AST SERPL-CCNC: 35 U/L (ref 0–31)
BASOPHILS # BLD: 0.07 K/UL (ref 0–0.2)
BASOPHILS NFR BLD: 1 % (ref 0–2)
BILIRUB SERPL-MCNC: 0.4 MG/DL (ref 0–1.2)
BUN SERPL-MCNC: 16 MG/DL (ref 6–23)
CALCIUM SERPL-MCNC: 9.6 MG/DL (ref 8.6–10.2)
CHLORIDE SERPL-SCNC: 103 MMOL/L (ref 98–107)
CHOLEST SERPL-MCNC: 207 MG/DL
CO2 SERPL-SCNC: 25 MMOL/L (ref 22–29)
CREAT SERPL-MCNC: 0.7 MG/DL (ref 0.5–1)
CREAT UR-MCNC: 77.9 MG/DL (ref 29–226)
CRP SERPL HS-MCNC: <3 MG/L (ref 0–5)
EOSINOPHIL # BLD: 0.33 K/UL (ref 0.05–0.5)
EOSINOPHILS RELATIVE PERCENT: 4 % (ref 0–6)
ERYTHROCYTE [DISTWIDTH] IN BLOOD BY AUTOMATED COUNT: 12.2 % (ref 11.5–15)
ERYTHROCYTE [SEDIMENTATION RATE] IN BLOOD BY WESTERGREN METHOD: 17 MM/HR (ref 0–20)
GFR, ESTIMATED: >90 ML/MIN/1.73M2
GLUCOSE SERPL-MCNC: 48 MG/DL (ref 74–99)
HBA1C MFR BLD: 9.4 % (ref 4–5.6)
HCT VFR BLD AUTO: 38.5 % (ref 34–48)
HDLC SERPL-MCNC: 73 MG/DL
HGB BLD-MCNC: 12.7 G/DL (ref 11.5–15.5)
IMM GRANULOCYTES # BLD AUTO: 0.03 K/UL (ref 0–0.58)
IMM GRANULOCYTES NFR BLD: 0 % (ref 0–5)
LDLC SERPL CALC-MCNC: 120 MG/DL
LYMPHOCYTES NFR BLD: 3.68 K/UL (ref 1.5–4)
LYMPHOCYTES RELATIVE PERCENT: 41 % (ref 20–42)
MCH RBC QN AUTO: 30.2 PG (ref 26–35)
MCHC RBC AUTO-ENTMCNC: 33 G/DL (ref 32–34.5)
MCV RBC AUTO: 91.7 FL (ref 80–99.9)
MICROALBUMIN UR-MCNC: <12 MG/L (ref 0–19)
MICROALBUMIN/CREAT UR-RTO: NORMAL MCG/MG CREAT (ref 0–30)
MONOCYTES NFR BLD: 0.75 K/UL (ref 0.1–0.95)
MONOCYTES NFR BLD: 8 % (ref 2–12)
NEUTROPHILS NFR BLD: 46 % (ref 43–80)
NEUTS SEG NFR BLD: 4.18 K/UL (ref 1.8–7.3)
PLATELET # BLD AUTO: 341 K/UL (ref 130–450)
PMV BLD AUTO: 10.3 FL (ref 7–12)
POTASSIUM SERPL-SCNC: 4.2 MMOL/L (ref 3.5–5)
PROT SERPL-MCNC: 7.6 G/DL (ref 6.4–8.3)
RBC # BLD AUTO: 4.2 M/UL (ref 3.5–5.5)
SODIUM SERPL-SCNC: 138 MMOL/L (ref 132–146)
TRIGL SERPL-MCNC: 72 MG/DL
TSH SERPL DL<=0.05 MIU/L-ACNC: 0.67 UIU/ML (ref 0.27–4.2)
VLDLC SERPL CALC-MCNC: 14 MG/DL
WBC OTHER # BLD: 9 K/UL (ref 4.5–11.5)

## 2024-09-17 PROCEDURE — 85025 COMPLETE CBC W/AUTO DIFF WBC: CPT

## 2024-09-17 PROCEDURE — 83036 HEMOGLOBIN GLYCOSYLATED A1C: CPT

## 2024-09-17 PROCEDURE — 85652 RBC SED RATE AUTOMATED: CPT

## 2024-09-17 PROCEDURE — 86140 C-REACTIVE PROTEIN: CPT

## 2024-09-17 PROCEDURE — 80053 COMPREHEN METABOLIC PANEL: CPT

## 2024-09-17 PROCEDURE — 82043 UR ALBUMIN QUANTITATIVE: CPT

## 2024-09-17 PROCEDURE — 80061 LIPID PANEL: CPT

## 2024-09-17 PROCEDURE — 84443 ASSAY THYROID STIM HORMONE: CPT

## 2024-09-17 PROCEDURE — 36415 COLL VENOUS BLD VENIPUNCTURE: CPT

## 2024-09-17 PROCEDURE — 82570 ASSAY OF URINE CREATININE: CPT

## 2024-10-16 ENCOUNTER — OFFICE VISIT (OUTPATIENT)
Dept: ENDOCRINOLOGY | Age: 59
End: 2024-10-16
Payer: COMMERCIAL

## 2024-10-16 VITALS
DIASTOLIC BLOOD PRESSURE: 71 MMHG | SYSTOLIC BLOOD PRESSURE: 178 MMHG | BODY MASS INDEX: 26.75 KG/M2 | OXYGEN SATURATION: 100 % | WEIGHT: 151 LBS | HEART RATE: 75 BPM | HEIGHT: 63 IN

## 2024-10-16 DIAGNOSIS — R53.82 CHRONIC FATIGUE: ICD-10-CM

## 2024-10-16 DIAGNOSIS — E10.65 TYPE 1 DIABETES MELLITUS WITH HYPERGLYCEMIA (HCC): Primary | ICD-10-CM

## 2024-10-16 DIAGNOSIS — E03.9 HYPOTHYROIDISM, UNSPECIFIED TYPE: ICD-10-CM

## 2024-10-16 PROCEDURE — 3046F HEMOGLOBIN A1C LEVEL >9.0%: CPT | Performed by: CLINICAL NURSE SPECIALIST

## 2024-10-16 PROCEDURE — 99214 OFFICE O/P EST MOD 30 MIN: CPT | Performed by: CLINICAL NURSE SPECIALIST

## 2024-10-16 RX ORDER — INSULIN LISPRO 100 [IU]/ML
INJECTION, SOLUTION INTRAVENOUS; SUBCUTANEOUS
Qty: 5 ADJUSTABLE DOSE PRE-FILLED PEN SYRINGE | Refills: 3 | Status: SHIPPED | OUTPATIENT
Start: 2024-10-16

## 2024-10-16 RX ORDER — BLOOD-GLUCOSE SENSOR
EACH MISCELLANEOUS
Qty: 2 EACH | Refills: 5 | Status: SHIPPED | OUTPATIENT
Start: 2024-10-16

## 2024-10-16 RX ORDER — INSULIN GLARGINE 100 [IU]/ML
15 INJECTION, SOLUTION SUBCUTANEOUS NIGHTLY
Qty: 5 ADJUSTABLE DOSE PRE-FILLED PEN SYRINGE | Refills: 3 | Status: SHIPPED | OUTPATIENT
Start: 2024-10-16

## 2024-10-16 NOTE — PROGRESS NOTES
01/26/2021 12:00 PM     Lab Results   Component Value Date/Time    LABA1C 9.4 09/17/2024 04:21 PM    LABA1C 9.1 03/14/2024 02:58 PM    LABA1C 8.7 09/22/2023 12:45 PM     Lab Results   Component Value Date/Time    TRIG 72 09/17/2024 04:21 PM    HDL 73 09/17/2024 04:21 PM    CHOL 207 09/17/2024 04:21 PM     Lab Results   Component Value Date/Time    VITD25 30 04/06/2023 12:00 PM    VITD25 30 09/21/2022 01:00 PM       ASSESSMENT & RECOMMENDATIONS   Vandana Mancilla, a 59 y.o.-old female seen in for the following issues     Diabetes Mellitus Type 1     Patient's diabetes is uncontrolled.  Hba1c 9.4  Stop NPH and Regular   Start Humalog pen 5 units 3 times daily with meals plus moderate dose insulin sliding scale  Start Lantus pen 15 units at night  Discussed with patient A1c and blood sugar goals   Patient will need routine diabetes maintenance and prevention  Will attempt alvarado 3  Send blood glucose log in 2 weeks    Dietary noncompliance  On [place] 90 mg daily  Pt wasn't able to tolerate Synthroid of Levothyroxine in the past  Last TFTs within normal limits      Chronic fatigue   Counseled on optimal blood glucose control  TFTs were checked and are normal  Cortisol was checked in the past and was normal    I personally reviewed external notes from PCP and other patient's care team providers, and personally interpreted labs associated with the above diagnosis. I also ordered labs to further assess and manage the above addressed medical conditions.     Return in about 3 months (around 1/16/2025).    The above issues were reviewed with the patient who understood and agreed with the plan.    Thank you for allowing us to participate in the care of this patient. Please do not hesitate to contact us with any additional questions.    Diagnosis Orders   1. Type 1 diabetes mellitus with hyperglycemia (HCC)        2. Hypothyroidism, unspecified type        3. Chronic fatigue            NAOMI Myers - CNS   Aline

## 2024-10-18 DIAGNOSIS — E10.65 TYPE 1 DIABETES MELLITUS WITH HYPERGLYCEMIA (HCC): Primary | ICD-10-CM

## 2024-10-24 ENCOUNTER — FOLLOWUP TELEPHONE ENCOUNTER (OUTPATIENT)
Dept: ENDOCRINOLOGY | Age: 59
End: 2024-10-24

## 2024-10-24 NOTE — TELEPHONE ENCOUNTER
Spoke with patient over the phone to schedule for diabetes education. She has had DM many years, attended DE classes in hte past. Does not follow consistent meal plan due to work schedule. She has occasional hypoglycemia following breakfast. Insulin regimen updated to basal/bolus + ss on 10/16/24 however patient has not started following yet- she is nervous to start meal time insulin. Reviewed regimen with patient and explained appropriate injection timing with meals and meal spacing to prevent overlap in doses. Encouraged patient to start insulin regimen following return from vacation 11/3/24. Scheduled for diabetes meal planning on 11/5/24.

## 2024-11-06 ENCOUNTER — HOSPITAL ENCOUNTER (OUTPATIENT)
Dept: DIABETES SERVICES | Age: 59
Setting detail: THERAPIES SERIES
Discharge: HOME | End: 2024-11-06
Payer: COMMERCIAL

## 2024-11-06 ENCOUNTER — TELEPHONE (OUTPATIENT)
Dept: ENDOCRINOLOGY | Age: 59
End: 2024-11-06

## 2024-11-06 PROCEDURE — 97802 MEDICAL NUTRITION INDIV IN: CPT

## 2024-11-06 NOTE — PROGRESS NOTES
MNT Note for Diabetes Education    Date: 2024  Patient Name: Vandana Mancilla  Referring Clinician: Kermit Owen Jr., MD    Reason for Visit: meal plan, desire to lose some weight   Sex: female    Age: 59 y.o.      History of attending DSMES: [] Yes  [] No  Date:  History of MNT: [x] Yes  [] No   Date: unknown, states that she has been managing DM for over 50 years     NUTRITION ASSESSMENT:    Anthropometrics:    Height: 64 inches    CBW: 151 lbs  BMI: 26.75        Past Medical History:   Diagnosis Date    Acquired hypothyroidism 10/09/2016    Closed burst fracture of lumbar vertebra (HCC) 10/09/2016    L3, fixation surgically    Closed fracture of left olecranon process 10/09/2016    Closed fracture of right patella 10/09/2016    ORIF    Closed wedge compression fracture of first lumbar vertebra with routine healing 10/09/2016    Kyphoplasty    Decreased dorsalis pedis pulse 2024    Difficulty walking 2024    The legs feel heavy    Heart murmur     no problems. Does not see cardiologist    Hyperlipidemia     PVD (peripheral vascular disease) ([facility])     Sciatica of left side 10/09/2016    Spider veins 2024    Thyroid disease     Type 1 diabetes mellitus (HCC) 10/09/2016       Past Surgical History:   Procedure Laterality Date    BACK SURGERY  2016    CARPAL TUNNEL RELEASE  2012    bilateral     SECTION      x 2    COLONOSCOPY  11    EYE SURGERY  2017    cataract & lense placement    KNEE SURGERY  2016       Family History   Problem Relation Age of Onset    High Blood Pressure Mother     Arthritis Mother     Arthritis Father     High Blood Pressure Father     No Known Problems Sister     No Known Problems Sister     No Known Problems Brother     No Known Problems Son     No Known Problems Son     No Known Problems Brother     No Known Problems Brother     No Known Problems Brother         Diabetes Managed by: [] Diet only  [] Oral agents   [x] Insulin        []Injectables

## 2024-11-06 NOTE — TELEPHONE ENCOUNTER
Pt called upset about her new insulin regimen that changed at her last visit 10/16/24. She just started the Lantus and Humalog and is struggling to keep her blood sugar down and is struggling with having times with meals to appropriately check blood sugar and figure out sliding scale. Pt also c/o of dizziness and says at times her sugar has been over 500. Told pt we would need to see a log of the week of blood sugar on new regimen to make recommendations. Pt is going to have her  fax it from his work tomorrow.

## 2024-11-12 ENCOUNTER — TELEPHONE (OUTPATIENT)
Dept: ENDOCRINOLOGY | Age: 59
End: 2024-11-12

## 2024-11-13 NOTE — TELEPHONE ENCOUNTER
If patient is taking Lantus 15 units and Humalog 5 units with meals plus insulin sliding scale please have her increase to Lantus 20 units once per day and Humalog 7 units with meals plus insulin sliding scale.  Send log in 1 week   
Lm for pt to call back   
Log and regimen attached    
Noted.  Patient can continue Lantus 15 units and increase Humalog to 6 units with meals plus scale.  Please have patient call if blood sugar become less than 70.  Symptoms less likely related to blood sugars  
Pt notified Symptoms less likely related to blood sugars. Call if they would go under 70. Keep Lantus at 15 units and increase humalog to 6 with meals.   
Pt states she isn't sure about these changes because she feels she is dropping and gets light headed. I told her that most of the sugars she sent were high, she said this morning is 120 and she at orange juice, I told her that 120 was good but wanted me to double check with you.   
If taking narcotic pain medications

## 2025-02-18 ENCOUNTER — HOSPITAL ENCOUNTER (OUTPATIENT)
Age: 60
Discharge: HOME | End: 2025-02-18
Payer: COMMERCIAL

## 2025-02-18 LAB
ALBUMIN SERPL-MCNC: 4.5 G/DL (ref 3.5–5.2)
ALP SERPL-CCNC: 133 U/L (ref 35–104)
ALT SERPL-CCNC: 32 U/L (ref 0–32)
ANION GAP SERPL CALCULATED.3IONS-SCNC: 12 MMOL/L (ref 7–16)
AST SERPL-CCNC: 31 U/L (ref 0–31)
BASOPHILS # BLD: 0.05 K/UL (ref 0–0.2)
BASOPHILS NFR BLD: 1 % (ref 0–2)
BILIRUB SERPL-MCNC: 0.4 MG/DL (ref 0–1.2)
BUN SERPL-MCNC: 13 MG/DL (ref 6–23)
CALCIUM SERPL-MCNC: 9.7 MG/DL (ref 8.6–10.2)
CHLORIDE SERPL-SCNC: 99 MMOL/L (ref 98–107)
CHOLEST SERPL-MCNC: 209 MG/DL
CO2 SERPL-SCNC: 26 MMOL/L (ref 22–29)
CREAT SERPL-MCNC: 0.8 MG/DL (ref 0.5–1)
CREAT UR-MCNC: 95.2 MG/DL (ref 29–226)
EOSINOPHIL # BLD: 0.28 K/UL (ref 0.05–0.5)
EOSINOPHILS RELATIVE PERCENT: 3 % (ref 0–6)
ERYTHROCYTE [DISTWIDTH] IN BLOOD BY AUTOMATED COUNT: 12 % (ref 11.5–15)
GFR, ESTIMATED: 88 ML/MIN/1.73M2
GLUCOSE SERPL-MCNC: 205 MG/DL (ref 74–99)
HBA1C MFR BLD: 9.2 % (ref 4–5.6)
HCT VFR BLD AUTO: 40.9 % (ref 34–48)
HDLC SERPL-MCNC: 77 MG/DL
HGB BLD-MCNC: 12.6 G/DL (ref 11.5–15.5)
IMM GRANULOCYTES # BLD AUTO: 0.03 K/UL (ref 0–0.58)
IMM GRANULOCYTES NFR BLD: 0 % (ref 0–5)
LDLC SERPL CALC-MCNC: 111 MG/DL
LYMPHOCYTES NFR BLD: 3.01 K/UL (ref 1.5–4)
LYMPHOCYTES RELATIVE PERCENT: 33 % (ref 20–42)
MCH RBC QN AUTO: 29.9 PG (ref 26–35)
MCHC RBC AUTO-ENTMCNC: 30.8 G/DL (ref 32–34.5)
MCV RBC AUTO: 96.9 FL (ref 80–99.9)
MICROALBUMIN UR-MCNC: 28 MG/L (ref 0–19)
MICROALBUMIN/CREAT UR-RTO: 29 MCG/MG CREAT (ref 0–30)
MONOCYTES NFR BLD: 0.69 K/UL (ref 0.1–0.95)
MONOCYTES NFR BLD: 8 % (ref 2–12)
NEUTROPHILS NFR BLD: 56 % (ref 43–80)
NEUTS SEG NFR BLD: 5.14 K/UL (ref 1.8–7.3)
PLATELET # BLD AUTO: 340 K/UL (ref 130–450)
PMV BLD AUTO: 10.3 FL (ref 7–12)
POTASSIUM SERPL-SCNC: 4.3 MMOL/L (ref 3.5–5)
PROT SERPL-MCNC: 7.8 G/DL (ref 6.4–8.3)
RBC # BLD AUTO: 4.22 M/UL (ref 3.5–5.5)
SODIUM SERPL-SCNC: 137 MMOL/L (ref 132–146)
TRIGL SERPL-MCNC: 105 MG/DL
TSH SERPL DL<=0.05 MIU/L-ACNC: 0.27 UIU/ML (ref 0.27–4.2)
VLDLC SERPL CALC-MCNC: 21 MG/DL
WBC OTHER # BLD: 9.2 K/UL (ref 4.5–11.5)

## 2025-02-18 PROCEDURE — 80053 COMPREHEN METABOLIC PANEL: CPT

## 2025-02-18 PROCEDURE — 80061 LIPID PANEL: CPT

## 2025-02-18 PROCEDURE — 83036 HEMOGLOBIN GLYCOSYLATED A1C: CPT

## 2025-02-18 PROCEDURE — 82570 ASSAY OF URINE CREATININE: CPT

## 2025-02-18 PROCEDURE — 85025 COMPLETE CBC W/AUTO DIFF WBC: CPT

## 2025-02-18 PROCEDURE — 84443 ASSAY THYROID STIM HORMONE: CPT

## 2025-02-18 PROCEDURE — 82043 UR ALBUMIN QUANTITATIVE: CPT

## 2025-02-18 PROCEDURE — 36415 COLL VENOUS BLD VENIPUNCTURE: CPT

## 2025-02-25 ENCOUNTER — TELEPHONE (OUTPATIENT)
Dept: ENDOCRINOLOGY | Age: 60
End: 2025-02-25

## 2025-02-25 NOTE — TELEPHONE ENCOUNTER
Patient states her sugar has been bouncing around a lot.    She is not sure if it is over 400 or 600 and it goes down below 55/5 at night and in the morning it is back up to 300/400's.  Patient is scheduled with Dr Tello in Sept but would like to be seen sooner and discussing getting an insuln pump.  Thinks the shots might not be working any longer.  Patient is getting very concerned.  Requesting a call back to advise next steps. If patient does not answer please leave a message.

## 2025-02-25 NOTE — TELEPHONE ENCOUNTER
Patient states her sugar has been bouncing around a lot.    She is not sure if it is over 400 or 600 and it goes down below 55/5 at night and in the morning it is back up to 300/400's.  Patient is scheduled with Dr Tello in Sept but would like to be seen sooner and discussing getting an insuln pump.  Thinks the shots might not be working any longer.  Patient is getting very concerned.  Requesting a call back to advise next steps. If patient does not answer please leave a message.           Patient called in when I talked to her she asked about being on a pump I explained a little about the pump told will have to check with the provider,     Michaelle is attached

## 2025-03-12 ENCOUNTER — OFFICE VISIT (OUTPATIENT)
Dept: ENDOCRINOLOGY | Age: 60
End: 2025-03-12
Payer: COMMERCIAL

## 2025-03-12 VITALS
TEMPERATURE: 98.2 F | SYSTOLIC BLOOD PRESSURE: 124 MMHG | WEIGHT: 155 LBS | BODY MASS INDEX: 26.46 KG/M2 | HEIGHT: 64 IN | DIASTOLIC BLOOD PRESSURE: 78 MMHG

## 2025-03-12 DIAGNOSIS — E03.9 ACQUIRED HYPOTHYROIDISM: ICD-10-CM

## 2025-03-12 DIAGNOSIS — E10.65 TYPE 1 DIABETES MELLITUS WITH HYPERGLYCEMIA (HCC): Primary | ICD-10-CM

## 2025-03-12 PROCEDURE — 3046F HEMOGLOBIN A1C LEVEL >9.0%: CPT | Performed by: NURSE PRACTITIONER

## 2025-03-12 PROCEDURE — 99214 OFFICE O/P EST MOD 30 MIN: CPT | Performed by: NURSE PRACTITIONER

## 2025-03-12 NOTE — PROGRESS NOTES
[facility]IC[facility]ANS [facility] Department of Endocrinology Diabetes and Metabolism   [address] [place] [id number]   Phone: [phone]  Fax: [phone]    Date of Service: 3/12/2025  Primary Care Physician: Kermit Owen Jr., MD  Referring physician: No ref. provider found  Provider: NAOMI Krishnamurthy NP    Reason for the visit:  DM type 1, Hypothyroidism     History of Present Illness:  The history is provided by the patient. No  was used. Accuracy of the patient data is excellent.  Vandana Mancilla is a very pleasant 59 y.o. female seen today for diabetes management     Vandana Mancilla was diagnosed with diabetes at age 11 and currently on NPH 5u BID, Regular 15-20 u AM, 3-5u with dinner   Checks BG 5-6 times per day    Michaelle 3 +  Tir  29%  Hyperglycemia  70%  Lows 1%  GMI 8.7%  Avg BS  224    Most recent A1c results summarized below  Lab Results   Component Value Date/Time    LABA1C 9.2 02/18/2025 02:58 PM    LABA1C 9.4 09/17/2024 04:21 PM    LABA1C 9.1 03/14/2024 02:58 PM       Patient has had some  hypoglycemic episodes   The patient has been mindful of what has been eating and following diabetic diet as encouraged  I reviewed current medications and the patient has no issues with diabetes medications  Vandana Mancilla is up to date with eye exam and denied any history of diabetic retinopathy   The patient seeing performs  own feet care  Microvascular complications:  No Retinopathy, Nephropathy or Neuropathy   Macrovascular complications: no CAD, PVD, or Stroke    Hypothyroidism   [place]  90 mg daily. Patient takes [place]  in the morning at empty stomach, wait one hour before eating , avoid multivitamins containing calcium  or iron with it.    Lab Results   Component Value Date/Time    TSH 0.27 02/18/2025 02:58 PM    T4FREE 1.04 11/03/2022 02:03 PM    FT3 2.7 10/18/2010 02:24 PM    Z9ZFLQM 85.04 02/11/2020 01:30 PM         PAST MEDICAL HISTORY   Past Medical

## 2025-03-13 RX ORDER — INSULIN ASPART 100 [IU]/ML
INJECTION, SOLUTION INTRAVENOUS; SUBCUTANEOUS
Qty: 50 ML | Refills: 3 | Status: SHIPPED | OUTPATIENT
Start: 2025-03-13

## 2025-03-14 DIAGNOSIS — E10.65 TYPE 1 DIABETES MELLITUS WITH HYPERGLYCEMIA (HCC): Primary | ICD-10-CM

## 2025-03-14 RX ORDER — INSULIN LISPRO 100 [IU]/ML
INJECTION, SOLUTION INTRAVENOUS; SUBCUTANEOUS
Qty: 90 ML | Refills: 3 | Status: SHIPPED | OUTPATIENT
Start: 2025-03-14

## 2025-06-17 ENCOUNTER — TELEPHONE (OUTPATIENT)
Dept: ENDOCRINOLOGY | Age: 60
End: 2025-06-17

## 2025-06-17 NOTE — TELEPHONE ENCOUNTER
Patient called and was put on medtronic pump and she is having all kinds of problems with it. The hose gets kinked, losses signal to sensor and fells out of pants when she goes to the bathroom. She would like to talk to someone about the omnipod pump.

## 2025-07-02 NOTE — TELEPHONE ENCOUNTER
Called patient. Patient states she has called [employer] and will follow up with you at her appt on the 07/15/2025.

## 2025-07-15 ENCOUNTER — OFFICE VISIT (OUTPATIENT)
Dept: ENDOCRINOLOGY | Age: 60
End: 2025-07-15
Payer: COMMERCIAL

## 2025-07-15 VITALS
DIASTOLIC BLOOD PRESSURE: 65 MMHG | RESPIRATION RATE: 18 BRPM | BODY MASS INDEX: 25.1 KG/M2 | OXYGEN SATURATION: 99 % | TEMPERATURE: 97.7 F | HEART RATE: 70 BPM | HEIGHT: 64 IN | SYSTOLIC BLOOD PRESSURE: 129 MMHG | WEIGHT: 147 LBS

## 2025-07-15 DIAGNOSIS — E03.9 ACQUIRED HYPOTHYROIDISM: ICD-10-CM

## 2025-07-15 DIAGNOSIS — E10.65 TYPE 1 DIABETES MELLITUS WITH HYPERGLYCEMIA (HCC): Primary | ICD-10-CM

## 2025-07-15 LAB — HBA1C MFR BLD: 7.8 %

## 2025-07-15 PROCEDURE — 3051F HG A1C>EQUAL 7.0%<8.0%: CPT | Performed by: NURSE PRACTITIONER

## 2025-07-15 PROCEDURE — 95251 CONT GLUC MNTR ANALYSIS I&R: CPT | Performed by: NURSE PRACTITIONER

## 2025-07-15 PROCEDURE — 83036 HEMOGLOBIN GLYCOSYLATED A1C: CPT | Performed by: NURSE PRACTITIONER

## 2025-07-15 PROCEDURE — 99214 OFFICE O/P EST MOD 30 MIN: CPT | Performed by: NURSE PRACTITIONER

## 2025-07-15 NOTE — PROGRESS NOTES
[facility]ANS [facility] Department of Endocrinology Diabetes and Metabolism   [address] [place] [id number]   Phone: [phone]  Fax: [phone]    Date of Service: 7/15/2025  Primary Care Physician: Kermit Owen Jr., MD  Referring physician: No ref. provider found  Provider: NAOMI Krishnamurthy NP    Reason for the visit:  DM type 1, Hypothyroidism     History of Present Illness:  The history is provided by the patient. No  was used. Accuracy of the patient data is excellent.  Vandana Mancilla is a very pleasant 60 y.o. female seen today for diabetes management     Vandana Mancilla was diagnosed with diabetes at age 11 and currently on Medtronic pump since last OV    Basal 0.5, CR  14, ISF 70,  -150, AIT  2:30 Max bolus 10 units    Previously on NPH 5u BID, Regular 15-20 u AM, 3-5u with dinner   Checks BG 5-6 times per day    Michaelle 3 +  Tir  59%  Hyperglycemia 41%  Lows 0%  GMI 7.6%  Avg BS  181    Most recent A1c results summarized below  Lab Results   Component Value Date/Time    LABA1C 7.8 07/15/2025 12:17 PM    LABA1C 9.2 02/18/2025 02:58 PM    LABA1C 9.4 09/17/2024 04:21 PM       Patient has had some  hypoglycemic episodes   The patient has been mindful of what has been eating and following diabetic diet as encouraged  I reviewed current medications and the patient has no issues with diabetes medications  Vandana Mancilla is up to date with eye exam and denied any history of diabetic retinopathy   The patient seeing performs  own feet care  Microvascular complications:  No Retinopathy, Nephropathy or Neuropathy   Macrovascular complications: no CAD, PVD, or Stroke    Hypothyroidism   [place]  90 mg daily. Patient takes [place]  in the morning at empty stomach, wait one hour before eating , avoid multivitamins containing calcium  or iron with it.    Lab Results   Component Value Date/Time    TSH 0.27 02/18/2025 02:58 PM    T4FREE 1.04 11/03/2022

## 2025-07-17 ENCOUNTER — HOSPITAL ENCOUNTER (OUTPATIENT)
Age: 60
Discharge: HOME | End: 2025-07-17
Payer: COMMERCIAL

## 2025-07-17 DIAGNOSIS — E03.9 ACQUIRED HYPOTHYROIDISM: ICD-10-CM

## 2025-07-17 DIAGNOSIS — E10.65 TYPE 1 DIABETES MELLITUS WITH HYPERGLYCEMIA (HCC): ICD-10-CM

## 2025-07-17 LAB
25(OH)D3 SERPL-MCNC: 37.7 NG/ML (ref 30–100)
ANION GAP SERPL CALCULATED.3IONS-SCNC: 10 MMOL/L (ref 7–16)
BUN SERPL-MCNC: 17 MG/DL (ref 8–23)
CALCIUM SERPL-MCNC: 9.5 MG/DL (ref 8.8–10.2)
CHLORIDE SERPL-SCNC: 105 MMOL/L (ref 98–107)
CHOLEST SERPL-MCNC: 176 MG/DL
CO2 SERPL-SCNC: 25 MMOL/L (ref 22–29)
CORTIS SERPL-MCNC: 16 UG/DL (ref 2.7–18.4)
CREAT SERPL-MCNC: 0.7 MG/DL (ref 0.5–1)
CREAT UR-MCNC: 69.6 MG/DL (ref 29–226)
ERYTHROCYTE [DISTWIDTH] IN BLOOD BY AUTOMATED COUNT: 12.2 % (ref 11.5–15)
GFR, ESTIMATED: >90 ML/MIN/1.73M2
GLUCOSE SERPL-MCNC: 120 MG/DL (ref 74–99)
HCT VFR BLD AUTO: 38.4 % (ref 34–48)
HDLC SERPL-MCNC: 60 MG/DL
HGB BLD-MCNC: 12.3 G/DL (ref 11.5–15.5)
LDLC SERPL CALC-MCNC: 104 MG/DL
MCH RBC QN AUTO: 30.6 PG (ref 26–35)
MCHC RBC AUTO-ENTMCNC: 32 G/DL (ref 32–34.5)
MCV RBC AUTO: 95.5 FL (ref 80–99.9)
MICROALBUMIN UR-MCNC: <12 MG/L (ref 0–20)
MICROALBUMIN/CREAT UR-RTO: <17 MCG/MG CREAT (ref 0–30)
PLATELET # BLD AUTO: 322 K/UL (ref 130–450)
PMV BLD AUTO: 10.2 FL (ref 7–12)
POTASSIUM SERPL-SCNC: 4.5 MMOL/L (ref 3.5–5.1)
RBC # BLD AUTO: 4.02 M/UL (ref 3.5–5.5)
SODIUM SERPL-SCNC: 140 MMOL/L (ref 136–145)
T4 FREE SERPL-MCNC: 1.1 NG/DL (ref 0.9–1.7)
TRIGL SERPL-MCNC: 64 MG/DL
TSH SERPL DL<=0.05 MIU/L-ACNC: 0.25 UIU/ML (ref 0.27–4.2)
VLDLC SERPL CALC-MCNC: 13 MG/DL
WBC OTHER # BLD: 5.3 K/UL (ref 4.5–11.5)

## 2025-07-17 PROCEDURE — 82306 VITAMIN D 25 HYDROXY: CPT

## 2025-07-17 PROCEDURE — 84443 ASSAY THYROID STIM HORMONE: CPT

## 2025-07-17 PROCEDURE — 82570 ASSAY OF URINE CREATININE: CPT

## 2025-07-17 PROCEDURE — 84439 ASSAY OF FREE THYROXINE: CPT

## 2025-07-17 PROCEDURE — 36415 COLL VENOUS BLD VENIPUNCTURE: CPT

## 2025-07-17 PROCEDURE — 80048 BASIC METABOLIC PNL TOTAL CA: CPT

## 2025-07-17 PROCEDURE — 82533 TOTAL CORTISOL: CPT

## 2025-07-17 PROCEDURE — 80061 LIPID PANEL: CPT

## 2025-07-17 PROCEDURE — 82043 UR ALBUMIN QUANTITATIVE: CPT

## 2025-07-17 PROCEDURE — 85027 COMPLETE CBC AUTOMATED: CPT
